# Patient Record
Sex: FEMALE | Race: WHITE | Employment: OTHER | URBAN - NONMETROPOLITAN AREA
[De-identification: names, ages, dates, MRNs, and addresses within clinical notes are randomized per-mention and may not be internally consistent; named-entity substitution may affect disease eponyms.]

---

## 2020-12-01 ENCOUNTER — HOSPITAL ENCOUNTER (OUTPATIENT)
Dept: GENERAL RADIOLOGY | Age: 60
Discharge: HOME | End: 2020-12-01
Payer: MEDICAID

## 2020-12-01 PROCEDURE — 3209999900 XR COMPARISON OF OUTSIDE FILMS

## 2020-12-21 ENCOUNTER — OFFICE VISIT (OUTPATIENT)
Dept: PAIN MANAGEMENT | Age: 60
End: 2020-12-21
Payer: MEDICARE

## 2020-12-21 VITALS — DIASTOLIC BLOOD PRESSURE: 80 MMHG | HEIGHT: 67 IN | SYSTOLIC BLOOD PRESSURE: 128 MMHG | BODY MASS INDEX: 33.67 KG/M2

## 2020-12-21 PROCEDURE — 99204 OFFICE O/P NEW MOD 45 MIN: CPT | Performed by: ANESTHESIOLOGY

## 2020-12-21 RX ORDER — TRIAMTERENE AND HYDROCHLOROTHIAZIDE 37.5; 25 MG/1; MG/1
1 TABLET ORAL DAILY
COMMUNITY

## 2020-12-21 RX ORDER — HYDROCODONE BITARTRATE AND ACETAMINOPHEN 5; 325 MG/1; MG/1
1 TABLET ORAL 2 TIMES DAILY PRN
Qty: 60 TABLET | Refills: 0 | Status: SHIPPED | OUTPATIENT
Start: 2020-12-21 | End: 2021-01-18 | Stop reason: SDUPTHER

## 2020-12-21 RX ORDER — PANTOPRAZOLE SODIUM 40 MG/1
40 TABLET, DELAYED RELEASE ORAL DAILY
COMMUNITY
End: 2021-11-11

## 2020-12-21 RX ORDER — ASPIRIN 81 MG/1
81 TABLET, CHEWABLE ORAL 4 TIMES DAILY
COMMUNITY

## 2020-12-21 RX ORDER — PAROXETINE HYDROCHLORIDE 20 MG/1
20 TABLET, FILM COATED ORAL EVERY MORNING
COMMUNITY

## 2020-12-21 RX ORDER — VARENICLINE TARTRATE 1 MG/1
1 TABLET, FILM COATED ORAL 2 TIMES DAILY
COMMUNITY
End: 2021-11-11 | Stop reason: ALTCHOICE

## 2020-12-21 RX ORDER — AMITRIPTYLINE HYDROCHLORIDE 25 MG/1
75 TABLET, FILM COATED ORAL NIGHTLY
COMMUNITY

## 2020-12-21 RX ORDER — CYCLOBENZAPRINE HCL 10 MG
10 TABLET ORAL 3 TIMES DAILY PRN
Qty: 90 TABLET | Refills: 2 | Status: SHIPPED | OUTPATIENT
Start: 2020-12-21 | End: 2021-01-19 | Stop reason: SDUPTHER

## 2020-12-21 RX ORDER — MECLIZINE HYDROCHLORIDE 25 MG/1
25 TABLET ORAL 3 TIMES DAILY PRN
COMMUNITY
End: 2021-11-11

## 2020-12-21 RX ORDER — TIZANIDINE 4 MG/1
4 TABLET ORAL EVERY 6 HOURS PRN
COMMUNITY
End: 2021-11-11 | Stop reason: ALTCHOICE

## 2020-12-21 RX ORDER — METOPROLOL SUCCINATE 25 MG/1
25 TABLET, EXTENDED RELEASE ORAL DAILY
COMMUNITY

## 2020-12-21 RX ORDER — PROMETHAZINE HYDROCHLORIDE 25 MG/1
25 TABLET ORAL EVERY 6 HOURS PRN
COMMUNITY

## 2020-12-21 RX ORDER — NAPROXEN 500 MG/1
500 TABLET ORAL 2 TIMES DAILY WITH MEALS
COMMUNITY
End: 2021-11-11

## 2020-12-21 NOTE — PATIENT INSTRUCTIONS
The following treatment recommendations and plan were discussed in detail with Turner File    Imaging:   I have reviewed patients imaging of XR L-spine and results were discussed with patient today. Analgesics: The patient is currently managing their pain with the use of Norco. We will continue this medication and start at a lower dose of 5 mg twice a day as needed. I recommend she take this medication only when her pain is severe (7/10). The side effect profile of long-term opioid use was discussed and recommended emphasis on multimodal strategies for pain management. Patient is taking Acetaminophen. Patient informed that the maximum amount of acetaminophen taken on a regular basis should only be 4000 mg per day. Interventions:   No interventions pursued at this visit. Anticoagulation/NPO Recommendations:   No interventions pursued at this visit. Multidisciplinary Pain Management:   In the presence of complex, chronic, and multi-factorial pain, the importance of a multidisciplinary approach to pain management in the patients management regimen was emphasized and discussed in great detail.      Referrals:  None    Prescriptions Written This Visit:   Norco    Follow-up: 4 weeks for Norco refill

## 2020-12-21 NOTE — PROGRESS NOTES
Chronic Pain Clinic Note     Encounter Date: 12/21/20    Subjective:   Chief Complaint:   Chief Complaint   Patient presents with    New Patient     lumbar pain        History of Present Illness:   Velia Graham is a 64 y.o. female seen in the Pain Clinic initially on 12/21/20 upon request from Neli Sanchez MD (PCP) for her history of chronic low back pain. 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 She history of seizures (on Topamax). She has been dealing with a longstanding history of chronic pain ever since her strokes. She describes most of her pain in her low back and upper neck. Her low back pain is primarily axial with no radiation of the legs. She does have the left leg weakness from the stroke but no new focal leg weakness, new paresthesias, saddle anesthesia, bowel/bladder incontinence. She primarily uses a walker and a wheelchair for ambulation. He states that her pain is worse with any upright activity and or moving around. Her pain is improved with rest and medications. She states that she was seeing Dr. Stef Chambers (pain management) who was previously prescribing her [address] however she states that her risk is about someone reporting that her med box was broken into and she violated her pain contract with the group. She states that she visited with her PCP recently put her on tramadol and she was unable to tolerate this. She states that she had previous epidural injection in the past which did not provide any relief and she has not had any previous RFA.     History of Interventions:   Surgery: None  Injections:  Epidural injections in past - no benefit    Current Treatment Medications:   Celexa 40 mg   Elavil 25 mg QHS  Paxil 20 mg  Tylenol PM - for sleep    Historical Treatment Medications:   Norco - unable to get refill  Flexeril - unable to get refill  Tramadol - hives  Baclofen - unable to tolerate    Imaging:  Lumbar XR  (5/7/19) Past Medical History:   Diagnosis Date    CHF (congestive heart failure) (HCC)     Chronic back pain     COPD (chronic obstructive pulmonary disease) (HCC)     Epilepsia (HCC)     GERD (gastroesophageal reflux disease)     Headache     Herniated disc     Osteoarthritis     Spinal stenosis     Stroke (HCC)     Type 2 diabetes mellitus without complication ([facility] [address]     Unspecified sleep apnea     Urinary incontinence        Past Surgical History:   Procedure Laterality Date    BACK SURGERY      CHOLECYSTECTOMY  8/16/2011    [facility]  2014    [facility]    UPPER [facility]STRO[facility]TESTINAL ENDOSCOPY  2014    [facility]       Family History   Problem Relation Age of Onset    Lung Cancer Mother     Cancer Mother 58        lung,bone and brain    Diabetes Father     Colon Cancer Neg Hx        Social History     Socioeconomic History    Marital status: Single     Spouse name: Not on file    Number of children: Not on file    Years of education: Not on file    Highest education level: Not on file   Occupational History    Not on file   Social Needs    Financial resource strain: Not on file    Food insecurity     Worry: Not on file     Inability: Not on file    Transportation needs     Medical: Not on file     Non-medical: Not on file   Tobacco Use    Smoking status: Current Every Day Smoker     Packs/day: 0.25     Years: 39.00     Pack years: 9.75     Types: Cigarettes     Start date: 5/23/1978    Smokeless tobacco: Never Used   Substance and Sexual Activity    Alcohol use: Yes     Comment: occasionally    Drug use: No    Sexual activity: Not on file   Lifestyle    Physical activity     Days per week: Not on file     Minutes per session: Not on file    Stress: Not on file   Relationships    Social connections     Talks on phone: Not on file     Gets together: Not on file     Attends Yazdanism service: Not on file     Active member of club or organization: Not on file  Metoclopramide Shortness Of Breath     Other reaction(s): Hives, Swelling - Throat  & throat swelling      Senna Itching and Rash     Other reaction(s): Difficulty breathing    Baclofen      Other reaction(s): Anaphylaxis    Dye [Iodides]     Folic Acid-Vitamin C Hives    Nicotine      Allergic to the patch    Folic Acid Hives       Review of Systems:   Constitutional: negative for weight changes or fevers  Genitourinary: negative for bowel/bladder incontinence   Musculoskeletal: positive for low back pain  Neurological: admits to left-sided weakness (previous CVA)  Behavioral/Psych: admits to anxiety/depression   All other systems reviewed and are negative    Objective:     Vitals:    12/21/20 1426   BP: 128/80       Constitutional: Pleasant, no acute distress   Head: Normocephalic, atraumatic   Eyes: Conjunctivae normal   Neck: Supple, symmetrical   Lungs: Normal respiratory effort, non-labored breathing   Cardiovascular: Limbs warm and well perfused   Abdomen: Non-protruded   Musculoskeletal: Muscle bulk symmetric, no atrophy, no gross deformities   · Lumbar Spine: SLR neg bilaterally. Positive facet loading bilaterally. Neurological: Cranial nerves II-XII grossly intact. · Gait - Normal, wheelchair dependent  · Motor: Left sided hemiparesis  · Sensory: LT sensation intact in lower limbs   · Reflexes: Negative ankle clonus  Skin: No rashes or lesions visible in lower limbs  Psychological: Cooperative, no exaggerated pain behaviors         Assessment:    Diagnosis Orders   1. Other chronic pain  HYDROcodone-acetaminophen (NORCO) 5-325 MG per tablet   2. Left-sided weakness     3. Lumbar facet joint pain     4.  Chronic bilateral low back pain without sciatica Rodo Hilton is a 64 y. o.female presenting to the pain clinic for evaluation of chronic low back pain. She has residual left-sided hemiparesis from a stroke 18 years ago. She does have lumbar facet mediated pain. We had a lengthy discussion about the use of long-term opioids. I have started her on a lower dose of her Norco 5 mg that she can take twice a day as needed for severe pain. I have continued her Flexeril as needed for muscle spasm/tightness. We will likely look at other adjuvants to start to help with her chronic pain. Additionally may pursue lumbar facet medial branch blocks to help with her back pain. We will follow-up in 4 weeks. I will obtain a urine drug screen at the next follow-up. Plan: The following treatment recommendations and plan were discussed in detail with Rodo Hilton    Imaging:   I have reviewed patients imaging of XR L-spine and results were discussed with patient today. Analgesics: The patient is currently managing their pain with the use of Norco. We will continue this medication and start at a lower dose of 5 mg twice a day as needed with plans to switch to other adjuvants as tolerated. I recommend she take this medication only when her pain is severe (7/10). I had an extensive discussion about the long-term use of opioids to treat non-cancer related pain and the risks involved including development of tolerance, physical dependence, and abuse potential.    OARRS reviewed today and is appropriate (12/21/20). Pain contract signed today (12/21/20). The side effect profile of long-term opioid use was discussed and recommended emphasis on multimodal strategies for pain management. Patient is taking Acetaminophen. Patient informed that the maximum amount of acetaminophen taken on a regular basis should only be 4000 mg per day. Interventions:   No interventions pursued at this visit.     Anticoagulation/NPO Recommendations: No interventions pursued at this visit. Multidisciplinary Pain Management:   In the presence of complex, chronic, and multi-factorial pain, the importance of a multidisciplinary approach to pain management in the patients management regimen was emphasized and discussed in great detail.      Referrals:  None    Prescriptions Written This Visit:   Lashonda Sheth (#60, 0 refills)    Follow-up: 4 weeks for Norco refill      Barbie Shahid DO  Interventional Pain Management/PM&R   [place]

## 2021-01-18 ENCOUNTER — TELEPHONE (OUTPATIENT)
Dept: PHYSICAL MEDICINE AND REHAB | Age: 61
End: 2021-01-18

## 2021-01-18 ENCOUNTER — OFFICE VISIT (OUTPATIENT)
Dept: PHYSICAL MEDICINE AND REHAB | Age: 61
End: 2021-01-18
Payer: MEDICARE

## 2021-01-18 VITALS
TEMPERATURE: 96.6 F | WEIGHT: 215 LBS | HEIGHT: 67 IN | BODY MASS INDEX: 33.74 KG/M2 | DIASTOLIC BLOOD PRESSURE: 80 MMHG | SYSTOLIC BLOOD PRESSURE: 132 MMHG

## 2021-01-18 DIAGNOSIS — M54.59 LUMBAR FACET JOINT PAIN: Primary | ICD-10-CM

## 2021-01-18 DIAGNOSIS — I69.354 HEMIPARESIS AFFECTING LEFT SIDE AS LATE EFFECT OF STROKE (HCC): ICD-10-CM

## 2021-01-18 DIAGNOSIS — M79.2 NEUROPATHIC PAIN: ICD-10-CM

## 2021-01-18 DIAGNOSIS — G89.29 OTHER CHRONIC PAIN: ICD-10-CM

## 2021-01-18 PROCEDURE — 99214 OFFICE O/P EST MOD 30 MIN: CPT | Performed by: ANESTHESIOLOGY

## 2021-01-18 RX ORDER — HYDROCODONE BITARTRATE AND ACETAMINOPHEN 5; 325 MG/1; MG/1
1 TABLET ORAL 2 TIMES DAILY PRN
Qty: 60 TABLET | Refills: 0 | Status: SHIPPED | OUTPATIENT
Start: 2021-01-21 | End: 2021-02-18 | Stop reason: SDUPTHER

## 2021-01-18 NOTE — PATIENT INSTRUCTIONS
The following treatment recommendations and plan were discussed in detail with Ramos Peterson and son. Imaging:   I have reviewed patients imaging of XR L-spine and results were discussed with patient today. Analgesics: The patient is currently managing their pain with the use of Norco. We will continue this medication and start at a lower dose of 5 mg twice a day as needed with plans to switch to other adjuvants as tolerated. I recommend she take this medication only when her pain is severe (7/10). I had an extensive discussion about the long-term use of opioids to treat non-cancer related pain and the risks involved including development of tolerance, physical dependence, and abuse potential.    OARRS reviewed today and is appropriate (1/18/21). Pain contract signed (12/21/20). UDS performed today (1/18/21). Patient is taking Acetaminophen. Patient informed that the maximum amount of acetaminophen taken on a regular basis should only be 4000 mg per day. Interventions: In presence of lumbar axial back pain and with physical exam consistent for facetal pain, the option of medial branch blocks at bilateral L4/L5 and L5/S1 was chosen. The risks and benefits were discussed in detail with the patient. Patient wants to proceed with the injection. Anticoagulation/NPO Recommendations:   Patient does not need to hold any medications prior to the procedure. Patient will need to be NPO x 8 priors to the procedure. We will start an IV prior to the procedure. Multidisciplinary Pain Management:   In the presence of complex, chronic, and multi-factorial pain, the importance of a multidisciplinary approach to pain management in the patients management regimen was emphasized and discussed in great detail.      Referrals:  None    Prescriptions Written This Visit:   Philly Zhong (#60, 0 refills)    Follow-up: For lumbar MBBs

## 2021-01-18 NOTE — PROGRESS NOTES
Chronic Pain Clinic Note     Encounter Date: 1/18/21    Subjective:   Chief Complaint:   Chief Complaint   Patient presents with    Follow-up     4 week medication follow up        History of Present Illness:   Juana Gallegos is a 64 y.o. female seen in the Pain Clinic initially on 12/21/20 upon request from Adam Barron MD (PCP) for her history of chronic low back pain. 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 She history of seizures (on Topamax). She has been dealing with a longstanding history of chronic pain ever since her strokes. She describes most of her pain in her low back and upper neck. Her low back pain is primarily axial with no radiation of the legs. She does have the left leg weakness from the stroke but no new focal leg weakness, new paresthesias, saddle anesthesia, bowel/bladder incontinence. She primarily uses a walker and a wheelchair for ambulation. He states that her pain is worse with any upright activity and or moving around. Her pain is improved with rest and medications. She states that she was seeing Dr. Talon Farmer (pain management) who was previously prescribing her [facility] however she states that her risk is about someone reporting that her med box was broken into and she violated her pain contract with the group. She states that she visited with her PCP recently put her on tramadol and she was unable to tolerate this. She states that she had previous epidural injection in the past which did not provide any relief and she has not had any previous RFA. Today, 1/18/2021, patient presents for planned follow-up of chronic low back pain. She endorses that she continues to have low back pain with no pain radiating down into the legs. She denies any new leg weakness, new paresthesias, saddle anesthesia, or bowel/bladder incontinence.   She states that the [facility] is helping out somewhat but still feels like she is not getting relief she is looking for. Her pain is interfering with her sleep and everyday activities.     History of Interventions:   Surgery: None  Injections:  Epidural injections in past - no benefit    Current Treatment Medications:   Norco 5 mg BID - prescribed by me  Celexa 40 mg   Elavil 25 mg QHS  Paxil 20 mg  Tylenol PM - for sleep    Historical Treatment Medications:   Norco - unable to get refill  Flexeril - unable to get refill  Tramadol - hives  Baclofen - unable to tolerate    Imaging:  Lumbar XR  (5/7/19)        Past Medical History:   Diagnosis Date    [facility]     Chronic back pain     COPD [facility] pulmonary disease) [facility]     Epilepsia ([facility]     GERD (gastroesophageal reflux disease)     Headache     Herniated disc     Osteoarthritis     Spinal stenosis     Stroke ([facility] [address]     Type 2 diabetes mellitus without complication ([facility] [address]     Unspecified sleep apnea     Urinary incontinence        Past Surgical History:   Procedure Laterality Date    BACK SURGERY      CHOLECYSTECTOMY  8/16/2011    [facility]  2014    [facility]    UPPER GASTRO[facility]TESTINAL ENDOSCOPY  2014    [facility]       Family History   Problem Relation Age of Onset    Lung Cancer Mother     Cancer Mother 58        lung,bone and brain    Diabetes Father     Colon Cancer Neg Hx        Social History     Socioeconomic History    Marital status: Single     Spouse name: Not on file    Number of children: Not on file    Years of education: Not on file    Highest education level: Not on file   Occupational History    Not on file   Social Needs    Financial resource strain: Not on file    Food insecurity     Worry: Not on file     Inability: Not on file    Transportation needs     Medical: Not on file     Non-medical: Not on file   Tobacco Use    Smoking status: Current Every Day Smoker     Packs/day: 0.25     Years: 39.00     Pack years: 9.75     Types: Cigarettes Start date: 5/23/1978    Smokeless tobacco: Never Used   Substance and Sexual Activity    Alcohol use: Yes     Comment: occasionally    Drug use: No    Sexual activity: Not on file   Lifestyle    Physical activity     Days per week: Not on file     Minutes per session: Not on file    Stress: Not on file   Relationships    Social connections     Talks on phone: Not on file     Gets together: Not on file     Attends [employer] service: Not on file     Active member of club or organization: Not on file     Attends meetings of clubs or organizations: Not on file     Relationship status: Not on file    Intimate partner violence     Fear of current or ex partner: Not on file     Emotionally abused: Not on file     Physically abused: Not on file     Forced sexual activity: Not on file   Other Topics Concern    Not on file   Social History Narrative    Not on file       Medications & Allergies:   Current Outpatient Medications   Medication Instructions    ALBUTEROL SULFATE IN 1 ampule, Nebulization, EVERY 6 HOURS PRN    amitriptyline (ELAVIL) 25 mg, Oral, NIGHTLY    aspirin 81 mg, Oral, DAILY    Chantix 1 mg, Oral, 2 TIMES DAILY    citalopram (CELEXA) 40 mg, Oral, DAILY    cyclobenzaprine (FLEXERIL) 10 mg, Oral, 3 TIMES DAILY PRN    diphenoxylate-atropine (LOMOTIL) 2.5-0.025 MG per tablet 1 tablet, 4 TIMES DAILY PRN    dipyridamole-aspirin (AGGRENOX)  MG per SR capsule 1 capsule, 4 TIMES DAILY    esomeprazole Magnesium (NEXIUM) 40 mg, Oral, 2 TIMES DAILY    [START ON 1/21/2021] HYDROcodone-acetaminophen (NORCO) 5-325 MG per tablet 1 tablet, Oral, 2 TIMES DAILY PRN    linaclotide (LINZESS) 145 mcg, Oral, DAILY BEFORE BREAKFAST    meclizine (ANTIVERT) 25 mg, Oral, 3 TIMES DAILY PRN    metoprolol succinate (TOPROL XL) 25 mg, Oral, DAILY    naproxen (NAPROSYN) 500 mg, Oral, 2 TIMES DAILY WITH MEALS    ondansetron (ZOFRAN) 8 mg, EVERY 8 HOURS PRN    oxybutynin (DITROPAN) 5 mg, 3 TIMES DAILY    pantoprazole (PROTONIX) 40 mg, Oral, DAILY    PARoxetine (PAXIL) 20 mg, Oral, EVERY MORNING    promethazine (PHENERGAN) 25 mg, Oral, EVERY 6 HOURS PRN    tiZANidine (ZANAFLEX) 4 mg, Oral, EVERY 6 HOURS PRN    triamterene-hydroCHLOROthiazide (MAXZIDE-25) 37.5-25 MG per tablet 1 tablet, Oral, DAILY       Allergies   Allergen Reactions    Fluticasone Itching and Shortness Of Breath     Other reaction(s): Swelling - Throat    Lac Bovis Shortness Of Breath and Swelling     Other reaction(s): Swelling - Throat    Metoclopramide Shortness Of Breath     Other reaction(s): Hives, Swelling - Throat  & throat swelling      Senna Itching and Rash     Other reaction(s): Difficulty breathing    Baclofen      Other reaction(s): Anaphylaxis    Dye [Iodides]     Folic Acid-Vitamin C Hives    Nicotine      Allergic to the patch    Folic Acid Hives       Review of Systems:   Constitutional: negative for weight changes or fevers  Genitourinary: negative for bowel/bladder incontinence   Musculoskeletal: positive for low back pain  Neurological: admits to left-sided weakness (previous CVA)  Behavioral/Psych: admits to anxiety/depression   All other systems reviewed and are negative    Objective:     Vitals:    01/18/21 1340   BP: 132/80   Temp: 96.6 °F (35.9 °C)       Constitutional: Pleasant, no acute distress   Head: Normocephalic, atraumatic   Eyes: Conjunctivae normal   Neck: Supple, symmetrical   Lungs: Normal respiratory effort, non-labored breathing   Cardiovascular: Limbs warm and well perfused   Abdomen: Non-protruded   Musculoskeletal: Muscle bulk symmetric, no atrophy, no gross deformities   · Lumbar Spine: SLR neg bilaterally. Positive facet loading bilaterally. Neurological: Cranial nerves II-XII grossly intact.    · Gait - Normal, wheelchair dependent  · Motor: Left sided hemiparesis  · Sensory: LT sensation intact in lower limbs   · Reflexes: Negative ankle clonus  Skin: No rashes or lesions visible in lower limbs  Psychological: Cooperative, no exaggerated pain behaviors         Assessment:    Diagnosis Orders   1. Lumbar facet joint pain  CHG FLUOR NEEDLE/CATH SPINE/PARASPINAL DX/THER ADDON    DE INJ DX/THER AGNT PARAVERT FACET JOINT, LUMBAR/SAC, 1ST LEVEL    DE INJ DX/THER AGNT PARAVERT FACET JOINT, LUMBAR/SAC, 2ND LEVEL    Urine Drug Screen   2. Other chronic pain  HYDROcodone-acetaminophen (NORCO) 5-325 MG per tablet   3. Hemiparesis affecting left side as late effect of stroke ([facility] [address]     4. Neuropathic pain           Juana Gallegos is a 64 y. o.female presenting to the pain clinic for evaluation of chronic low back pain. She has residual left-sided hemiparesis from a stroke 18 years ago. She does have lumbar facet mediated pain. We had a lengthy discussion about the use of long-term opioids. I have started her on a lower dose of her Norco 5 mg that she can take twice a day as needed for severe pain. I have continued her Flexeril as needed for muscle spasm/tightness. We will likely look at other adjuvants to start to help with her chronic pain. She has noted benefit on the Norco 5 mg twice a day as needed. I have ordered for a urine drug screen today. I have set her up for diagnostic lumbar medial branch blocks targeting the bilateral facet joints of L4/L5 and L5/S1. Plan: The following treatment recommendations and plan were discussed in detail with Juana Gallegos    Imaging:   I have reviewed patients imaging of XR L-spine and results were discussed with patient today. Analgesics: The patient is currently managing their pain with the use of Norco. We will continue this medication and start at a lower dose of 5 mg twice a day as needed with plans to switch to other adjuvants as tolerated. I recommend she take this medication only when her pain is severe (7/10).  I had an extensive discussion about the long-term use of opioids to treat non-cancer related pain and the risks involved including development of tolerance, physical dependence, and abuse potential.    OARRS reviewed today and is appropriate (1/18/21). Pain contract signed (12/21/20). UDS performed today (1/18/21). Patient is taking Acetaminophen. Patient informed that the maximum amount of acetaminophen taken on a regular basis should only be 4000 mg per day. Interventions: In presence of lumbar axial back pain and with physical exam consistent for facetal pain, the option of medial branch blocks at bilateral L4/L5 and L5/S1 was chosen. The risks and benefits were discussed in detail with the patient. Patient wants to proceed with the injection. Anticoagulation/NPO Recommendations:   Patient does not need to hold any medications prior to the procedure. Patient will need to be NPO x 8 priors to the procedure. We will start an IV prior to the procedure. Multidisciplinary Pain Management:   In the presence of complex, chronic, and multi-factorial pain, the importance of a multidisciplinary approach to pain management in the patients management regimen was emphasized and discussed in great detail.      Referrals:  None    Prescriptions Written This Visit:   [address] [facility] (#60, 0 refills)    Follow-up: For lumbar MBBs      Christopher Drake,   Interventional Pain Management/PM&R   [place]

## 2021-01-19 RX ORDER — CYCLOBENZAPRINE HCL 10 MG
10 TABLET ORAL 3 TIMES DAILY PRN
Qty: 90 TABLET | Refills: 0 | Status: CANCELLED | OUTPATIENT
Start: 2021-01-19 | End: 2021-02-18

## 2021-01-19 RX ORDER — CYCLOBENZAPRINE HCL 10 MG
10 TABLET ORAL 3 TIMES DAILY PRN
Qty: 90 TABLET | Refills: 2 | Status: SHIPPED | OUTPATIENT
Start: 2021-01-19 | End: 2021-11-11

## 2021-01-19 NOTE — TELEPHONE ENCOUNTER
Last seen: 1/18/2021.    Follow-up:   Future Appointments   Date Time Provider Delgado Jimenez   2/15/2021 10:00 AM DO BETHANY Mcfadden [facility] [facility]

## 2021-01-19 NOTE — TELEPHONE ENCOUNTER
Medication sent to her pharmacy.       Mulu Ip, DO  Interventional Pain Management/PM&R   [place]

## 2021-02-01 ENCOUNTER — TELEPHONE (OUTPATIENT)
Dept: PHYSICAL MEDICINE AND REHAB | Age: 61
End: 2021-02-01

## 2021-02-01 NOTE — TELEPHONE ENCOUNTER
Pt. Called office regarding pre-procedure instructions. States that she has a fever,chills and a sore throat. Procedure and follow up rescheduled. Verbalized understanding.

## 2021-02-15 ENCOUNTER — TELEPHONE (OUTPATIENT)
Dept: PHYSICAL MEDICINE AND REHAB | Age: 61
End: 2021-02-15

## 2021-02-15 NOTE — TELEPHONE ENCOUNTER
Pt. Contacted. Procedure rescheduled due to being ill. Rescheduled to 2/23/2021 @ 8:30, arrive at 7:30am. Verbalized understanding.

## 2021-02-18 DIAGNOSIS — G89.29 OTHER CHRONIC PAIN: ICD-10-CM

## 2021-02-18 NOTE — TELEPHONE ENCOUNTER
Colonel Romero called requesting a refill on the following medications:  Requested Prescriptions     Pending Prescriptions Disp Refills    HYDROcodone-acetaminophen (NORCO) 5-325 MG per tablet 60 tablet 0     Sig: Take 1 tablet by mouth 2 times daily as needed for Pain for up to 30 days.      Pharmacy verified:  [facility] [place]    Date of last visit: 01/18/2021  Date of next visit (if applicable): 7/6/5627

## 2021-02-18 NOTE — TELEPHONE ENCOUNTER
OARRS reviewed. UDS: + for  Cyclobenazaprine, Dihydrocodeine, Hydrocodone, Norhydrocodone, Hydromorphone. Last seen: 1/18/2021.  Follow-up: 3/3/21

## 2021-02-19 RX ORDER — HYDROCODONE BITARTRATE AND ACETAMINOPHEN 5; 325 MG/1; MG/1
1 TABLET ORAL 2 TIMES DAILY PRN
Qty: 60 TABLET | Refills: 0 | Status: SHIPPED | OUTPATIENT
Start: 2021-02-20 | End: 2021-03-22 | Stop reason: SDUPTHER

## 2021-02-19 NOTE — TELEPHONE ENCOUNTER
Patient is typically required to have office visit with me to refill controlled substances. I understand the patient missed a few appointments for the procedures and lost follow-ups with me due to this. I just want patient to be aware for future refills on controlled substances.       Rosalia Lopez, DO  Interventional Pain Management/PM&R   [place]

## 2021-02-22 ENCOUNTER — TELEPHONE (OUTPATIENT)
Dept: PHYSICAL MEDICINE AND REHAB | Age: 61
End: 2021-02-22

## 2021-02-23 ENCOUNTER — TELEPHONE (OUTPATIENT)
Dept: PHYSICAL MEDICINE AND REHAB | Age: 61
End: 2021-02-23

## 2021-02-23 NOTE — TELEPHONE ENCOUNTER
Received call from the [facility]. Pt. Unable to have procedure today due to taking Naproxen. States it will need to be held 7 days prior to procedure. Procedure rescheduled to 3/20/2021 @ 2:40, arrive at 1:20. Follow up scheduled 3/12/2021 @ 11:30am. M for pt. To call office to verify receiving message and confirm pre-procedure instructions.

## 2021-03-01 ENCOUNTER — TELEPHONE (OUTPATIENT)
Dept: PHYSICAL MEDICINE AND REHAB | Age: 61
End: 2021-03-01

## 2021-03-01 RX ORDER — LORAZEPAM 1 MG/1
1 TABLET ORAL EVERY 8 HOURS PRN
Qty: 2 TABLET | Refills: 0 | Status: SHIPPED | OUTPATIENT
Start: 2021-03-01 | End: 2021-03-31

## 2021-03-01 NOTE — TELEPHONE ENCOUNTER
I sent the patient Ativan that she can fill up at her [address] I have the instructions of not on there and how she should take this prior to her procedure.     Thanks,    Nash Tejeda, DO  Interventional Pain Management/PM&R   [place]

## 2021-03-01 NOTE — TELEPHONE ENCOUNTER
Pre-Procedure Travel Screening Questions completed. Pt. States that you were going to prescribe something for her nerves before her procedure. Please advise. Procedure 3/2/2021.  Thanks

## 2021-03-22 ENCOUNTER — OFFICE VISIT (OUTPATIENT)
Dept: PHYSICAL MEDICINE AND REHAB | Age: 61
End: 2021-03-22
Payer: MEDICARE

## 2021-03-22 VITALS
SYSTOLIC BLOOD PRESSURE: 132 MMHG | WEIGHT: 252 LBS | BODY MASS INDEX: 41.99 KG/M2 | DIASTOLIC BLOOD PRESSURE: 62 MMHG | HEIGHT: 65 IN

## 2021-03-22 DIAGNOSIS — I69.354 HEMIPARESIS AFFECTING LEFT SIDE AS LATE EFFECT OF STROKE (HCC): ICD-10-CM

## 2021-03-22 DIAGNOSIS — M79.2 NEUROPATHIC PAIN: ICD-10-CM

## 2021-03-22 DIAGNOSIS — M54.59 LUMBAR FACET JOINT PAIN: Primary | ICD-10-CM

## 2021-03-22 DIAGNOSIS — G89.29 OTHER CHRONIC PAIN: ICD-10-CM

## 2021-03-22 PROCEDURE — 99214 OFFICE O/P EST MOD 30 MIN: CPT | Performed by: ANESTHESIOLOGY

## 2021-03-22 RX ORDER — HYDROCODONE BITARTRATE AND ACETAMINOPHEN 5; 325 MG/1; MG/1
1 TABLET ORAL 2 TIMES DAILY PRN
Qty: 90 TABLET | Refills: 0 | Status: SHIPPED | OUTPATIENT
Start: 2021-03-22 | End: 2021-05-05 | Stop reason: SDUPTHER

## 2021-03-22 NOTE — PATIENT INSTRUCTIONS
The following treatment recommendations and plan were discussed in detail with Cheryl Delilah    Imaging:   I have reviewed patients imaging of XR L-spine and results were discussed with patient today. Analgesics: The patient is currently managing their pain with the use of Norco. We will increase this medication to 5 mg every 8 hours as needed with plans to switch to other adjuvants as tolerated. I recommend she take this medication only when her pain is severe (7/10). I had an extensive discussion about the long-term use of opioids to treat non-cancer related pain and the risks involved including development of tolerance, physical dependence, and abuse potential.  I advised the patient lock and store this medication in a safe and secure space. If the medication is also stolen, I would not provide early refills. OARRS reviewed today and is appropriate. Pain contract signed (12/21/20). UDS reviewed and is appropriate. Patient is taking Acetaminophen. Patient informed that the maximum amount of acetaminophen taken on a regular basis should only be 4000 mg per day. Interventions:   No interventions pursued at this visit. Anticoagulation/NPO Recommendations:   No interventions pursued at this visit. Multidisciplinary Pain Management:   In the presence of complex, chronic, and multi-factorial pain, the importance of a multidisciplinary approach to pain management in the patients management regimen was emphasized and discussed in great detail.      Referrals:  None    Prescriptions Written This Visit:   [address] [facility] (#90, 0 refills)    Follow-up: 4 weeks

## 2021-03-22 NOTE — PROGRESS NOTES
Chronic Pain Clinic Note     Encounter Date: 3/22/21    Subjective:   Chief Complaint:   Chief Complaint   Patient presents with    Follow-up     f/u procedure       History of Present Illness:   Rachelle Khan is a 63 y.o. female seen in the Pain Clinic initially on 12/21/20 upon request from Cecilia Webster MD (PCP) for her history of chronic low back pain. 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 She history of seizures (on Topamax). She has been dealing with a longstanding history of chronic pain ever since her strokes. She describes most of her pain in her low back and upper neck. Her low back pain is primarily axial with no radiation of the legs. She does have the left leg weakness from the stroke but no new focal leg weakness, new paresthesias, saddle anesthesia, bowel/bladder incontinence. She primarily uses a walker and a wheelchair for ambulation. He states that her pain is worse with any upright activity and or moving around. Her pain is improved with rest and medications. She states that she was seeing Dr. Aaron Voss (pain management) who was previously prescribing her Arlon Brad however she states that her risk is about someone reporting that her med box was broken into and she violated her pain contract with the group. She states that she visited with her PCP recently put her on tramadol and she was unable to tolerate this. She states that she had previous epidural injection in the past which did not provide any relief and she has not had any previous RFA. Today, 3/22/2021, patient presents for planned follow-up for chronic low back pain. She states that she has been more pain lately. She has difficulty sleeping at night due to increased pain. She denies any new symptoms of focal leg weakness, new paresthesias, saddle anesthesia, bowel/bladder incontinence.   She feels like the Arlon Brad is taking the edge off but feels like she needs a middle of the day dose.   She was off any interventions at this point until she gets more help with getting a ride to the hospital.    History of Interventions:   Surgery: None  Injections:  Epidural injections in past - no benefit    Current Treatment Medications:   Norco 5 mg BID - prescribed by me  Celexa 40 mg   Elavil 25 mg QHS  Paxil 20 mg  Tylenol PM - for sleep    Historical Treatment Medications:   Norco - unable to get refill  Flexeril - unable to get refill  Tramadol - hives  Baclofen - unable to tolerate    Imaging:  Lumbar XR  (5/7/19)        Past Medical History:   Diagnosis Date    [facility]     Chronic back pain     [facility]     Epilepsia [facility]     GERD (gastroesophageal reflux disease)     Headache     Herniated disc     Hypoglycemia     Osteoarthritis     PONV (postoperative nausea and vomiting)     Prolonged emergence from general anesthesia     Spinal stenosis     Stroke [facility]     Unspecified sleep apnea     Urinary incontinence        Past Surgical History:   Procedure Laterality Date    BACK SURGERY      CHOLECYSTECTOMY  8/16/2011    [facility]    UPPER GASTROINTESTINAL ENDOSCOPY  2014    [facility]       Family History   Problem Relation Age of Onset    Lung Cancer Mother     Cancer Mother 58        lung,bone and brain    Diabetes Father     Colon Cancer Neg Hx        Social History     Socioeconomic History    Marital status: Single     Spouse name: Not on file    Number of children: Not on file    Years of education: Not on file    Highest education level: Not on file   Occupational History    Not on file   Social Needs    Financial resource strain: Not on file    Food insecurity     Worry: Not on file     Inability: Not on file    Transportation needs     Medical: Not on file     Non-medical: Not on file   Tobacco Use    Smoking status: Current Every Day Smoker     Packs/day: 0.25     Years: 39.00     Pack years: 9.75     Types: Cigarettes     Start date: 5/23/1978    Smokeless tobacco: Never Used    Tobacco comment: 2 cigarrettes per day   Substance and Sexual Activity    Alcohol use: Yes     Comment: occasionally    Drug use: No    Sexual activity: Not on file   Lifestyle    Physical activity     Days per week: Not on file     Minutes per session: Not on file    Stress: Not on file   Relationships    Social connections     Talks on phone: Not on file     Gets together: Not on file     Attends [employer] service: Not on file     Active member of club or organization: Not on file     Attends meetings of clubs or organizations: Not on file     Relationship status: Not on file    Intimate partner violence     Fear of current or ex partner: Not on file     Emotionally abused: Not on file     Physically abused: Not on file     Forced sexual activity: Not on file   Other Topics Concern    Not on file   Social History Narrative    Not on file       Medications & Allergies:   Current Outpatient Medications   Medication Instructions    ALBUTEROL SULFATE IN 1 ampule, Nebulization, EVERY 6 HOURS PRN    amitriptyline (ELAVIL) 25 mg, Oral, NIGHTLY    aspirin 81 mg, Oral, 4 TIMES DAILY    Chantix 1 mg, Oral, 2 TIMES DAILY    Ciprofloxacin (CIPRO PO) Oral    citalopram (CELEXA) 40 mg, Oral, DAILY    cyclobenzaprine (FLEXERIL) 10 mg, Oral, 3 TIMES DAILY PRN    diphenoxylate-atropine (LOMOTIL) 2.5-0.025 MG per tablet 1 tablet, 4 TIMES DAILY PRN    esomeprazole Magnesium (NEXIUM) 40 mg, Oral, 2 TIMES DAILY    HYDROcodone-acetaminophen (NORCO) 5-325 MG per tablet 1 tablet, Oral, 2 TIMES DAILY PRN    linaclotide (LINZESS) 145 mcg, Oral, DAILY BEFORE BREAKFAST    LORazepam (ATIVAN) 1 mg, Oral, EVERY 8 HOURS PRN, Please take 1 hour prior to procedure and can take additional dose 30 minutes prior to help with anxiety.     meclizine (ANTIVERT) 25 mg, Oral, 3 TIMES DAILY PRN    metoprolol succinate (TOPROL XL) 25 mg, Oral, DAILY    naproxen (NAPROSYN) 500 mg, Oral, 2 TIMES DAILY WITH MEALS    ondansetron (ZOFRAN) 8 mg, EVERY 8 HOURS PRN    oxybutynin (DITROPAN) 5 mg, 3 TIMES DAILY    pantoprazole (PROTONIX) 40 mg, Oral, DAILY    PARoxetine (PAXIL) 20 mg, Oral, EVERY MORNING    promethazine (PHENERGAN) 25 mg, Oral, EVERY 6 HOURS PRN    tiZANidine (ZANAFLEX) 4 mg, Oral, EVERY 6 HOURS PRN    triamterene-hydroCHLOROthiazide (MAXZIDE-25) 37.5-25 MG per tablet 1 tablet, Oral, DAILY       Allergies   Allergen Reactions    Fluticasone Itching and Shortness Of Breath     Other reaction(s): Swelling - Throat    Lac Bovis Shortness Of Breath and Swelling     Other reaction(s): Swelling - Throat    Metoclopramide Shortness Of Breath     Other reaction(s): Hives, Swelling - Throat  & throat swelling      Senna Itching and Rash     Other reaction(s): Difficulty breathing    Baclofen      Other reaction(s): Anaphylaxis    Dye [Iodides]     Folic Acid-Vitamin C Hives    Nicotine      Allergic to the patch    Trazodone And Nefazodone Hives    Folic Acid Hives       Review of Systems:   Constitutional: negative for weight changes or fevers  Genitourinary: negative for bowel/bladder incontinence   Musculoskeletal: positive for low back pain  Neurological: admits to left-sided weakness (previous CVA)  Behavioral/Psych: admits to anxiety/depression   All other systems reviewed and are negative    Objective:     Vitals:    03/22/21 1548   BP: 132/62       Constitutional: Pleasant, no acute distress   Head: Normocephalic, atraumatic   Eyes: Conjunctivae normal   Neck: Supple, symmetrical   Lungs: Normal respiratory effort, non-labored breathing   Cardiovascular: Limbs warm and well perfused   Abdomen: Non-protruded   Musculoskeletal: Muscle bulk symmetric, no atrophy, no gross deformities   · Lumbar Spine: SLR neg bilaterally. Positive facet loading bilaterally.   Neurological: Cranial nerves II-XII grossly intact. · Gait - Normal, wheelchair dependent  · Motor: Left sided hemiparesis  · Sensory: LT sensation intact in lower limbs   · Reflexes: Negative ankle clonus  Skin: No rashes or lesions visible in lower limbs  Psychological: Cooperative, no exaggerated pain behaviors         Assessment:    Diagnosis Orders   1. Lumbar facet joint pain     2. Other chronic pain  HYDROcodone-acetaminophen (NORCO) 5-325 MG per tablet   3. Hemiparesis affecting left side as late effect of stroke ([address]     4. Neuropathic pain           Stella Parra is a 63 y. o.female presenting to the pain clinic for evaluation of chronic low back pain. She has residual left-sided hemiparesis from a stroke 18 years ago. She does have lumbar facet mediated pain. We had a lengthy discussion about the use of long-term opioids. I have started her on a lower dose of her Norco 5 mg that she can take twice a day as needed for severe pain. I have continued her Flexeril as needed for muscle spasm/tightness. We will likely look at other adjuvants to start to help with her chronic pain. Because we are unable to proceed with a procedure. I have increased her Norco to 5 mg every 8 hours as needed for severe pain. Follow-up in 4 weeks. Plan: The following treatment recommendations and plan were discussed in detail with Stella Parra    Imaging:   I have reviewed patients imaging of XR L-spine and results were discussed with patient today. Analgesics: The patient is currently managing their pain with the use of Norco. We will increase this medication to 5 mg every 8 hours as needed with plans to switch to other adjuvants as tolerated. I recommend she take this medication only when her pain is severe (7/10).  I had an extensive discussion about the long-term use of opioids to treat non-cancer related pain and the risks involved including development of tolerance, physical dependence, and abuse potential.  I advised the patient lock and store this medication in a safe and secure space. If the medication is also stolen, I would not provide early refills. OARRS reviewed today and is appropriate. Pain contract signed (12/21/20). UDS reviewed and is appropriate. Patient is taking Acetaminophen. Patient informed that the maximum amount of acetaminophen taken on a regular basis should only be 4000 mg per day. Interventions:   No interventions pursued at this visit. Anticoagulation/NPO Recommendations:   No interventions pursued at this visit. Multidisciplinary Pain Management:   In the presence of complex, chronic, and multi-factorial pain, the importance of a multidisciplinary approach to pain management in the patients management regimen was emphasized and discussed in great detail.      Referrals:  None    Prescriptions Written This Visit:   [facility] (#90, 0 refills)    Follow-up: 4 weeks       Chavo Miller DO  Interventional Pain Management/PM&R   [place]

## 2021-04-26 ENCOUNTER — TELEPHONE (OUTPATIENT)
Dept: PHYSICAL MEDICINE AND REHAB | Age: 61
End: 2021-04-26

## 2021-04-26 NOTE — TELEPHONE ENCOUNTER
Spoke via phone, has fever nausea and vomiting today. Unable to do video visit. Does not have a smart phone. Increase to 3 tabs daily is now controlling pain.  Will need refill soon

## 2021-05-05 DIAGNOSIS — G89.29 OTHER CHRONIC PAIN: ICD-10-CM

## 2021-05-05 RX ORDER — HYDROCODONE BITARTRATE AND ACETAMINOPHEN 5; 325 MG/1; MG/1
1 TABLET ORAL EVERY 8 HOURS PRN
Qty: 90 TABLET | Refills: 0 | Status: SHIPPED | OUTPATIENT
Start: 2021-05-05 | End: 2021-06-04

## 2021-05-05 NOTE — TELEPHONE ENCOUNTER
Due to this being a controlled substance. I need the patient to come in for a follow-up within the next 4 weeks. Please see if we get the patient scheduled for the end of May or early June. This will be for a refill on the medication and to discuss how to proceed with her pain management.

## 2021-05-05 NOTE — TELEPHONE ENCOUNTER
Jed Del Valle called requesting a refill on the following medications:  Requested Prescriptions     Pending Prescriptions Disp Refills    HYDROcodone-acetaminophen (NORCO) 5-325 MG per tablet 90 tablet 0     Sig: Take 1 tablet by mouth 2 times daily as needed for Pain for up to 30 days.      Pharmacy verified:  [facility]       Date of last visit: 03-22-21  Date of next visit (if applicable): 2/62/7852

## 2021-05-06 NOTE — TELEPHONE ENCOUNTER
Attempted to contact this patient several times, unable to reach, only busy signal on both home and mobile numbers

## 2021-06-21 ENCOUNTER — OFFICE VISIT (OUTPATIENT)
Dept: PHYSICAL MEDICINE AND REHAB | Age: 61
End: 2021-06-21
Payer: MEDICARE

## 2021-06-21 VITALS
HEIGHT: 65 IN | BODY MASS INDEX: 41.99 KG/M2 | DIASTOLIC BLOOD PRESSURE: 62 MMHG | SYSTOLIC BLOOD PRESSURE: 116 MMHG | WEIGHT: 252 LBS

## 2021-06-21 DIAGNOSIS — I69.354 HEMIPARESIS AFFECTING LEFT SIDE AS LATE EFFECT OF STROKE (HCC): ICD-10-CM

## 2021-06-21 DIAGNOSIS — G89.29 OTHER CHRONIC PAIN: ICD-10-CM

## 2021-06-21 DIAGNOSIS — M79.2 NEUROPATHIC PAIN: ICD-10-CM

## 2021-06-21 DIAGNOSIS — M54.59 LUMBAR FACET JOINT PAIN: ICD-10-CM

## 2021-06-21 DIAGNOSIS — M54.10 RADICULAR LEG PAIN: Primary | ICD-10-CM

## 2021-06-21 PROCEDURE — 99214 OFFICE O/P EST MOD 30 MIN: CPT | Performed by: ANESTHESIOLOGY

## 2021-06-21 RX ORDER — PREGABALIN 75 MG/1
75 CAPSULE ORAL EVERY EVENING
Qty: 30 CAPSULE | Refills: 2 | Status: SHIPPED | OUTPATIENT
Start: 2021-06-21 | End: 2022-02-18

## 2021-06-21 RX ORDER — HYDROCODONE BITARTRATE AND ACETAMINOPHEN 5; 325 MG/1; MG/1
1 TABLET ORAL EVERY 8 HOURS PRN
Qty: 90 TABLET | Refills: 0 | Status: SHIPPED | OUTPATIENT
Start: 2021-06-21 | End: 2021-07-19 | Stop reason: SDUPTHER

## 2021-06-21 NOTE — PATIENT INSTRUCTIONS
The following treatment recommendations and plan were discussed in detail with Polo Roman    Imaging:   I have reviewed patients imaging of XR L-spine and results were discussed with patient today. Due to worsening radicular leg pain in the left leg and increased tone/spasticity, I have ordered an MRI of the lumbar spine for further evaluation for stenosis. Analgesics: The patient is currently managing their pain with the use of Norco. We will continue Norco at 5 mg every 8 hours as needed for pain. I recommend she take this medication only when her pain is severe (7/10). I had an extensive discussion about the long-term use of opioids to treat non-cancer related pain and the risks involved including development of tolerance, physical dependence, and abuse potential.  I advised the patient lock and store this medication in a safe and secure space. If the medication is also stolen, I would not provide early refills. OARRS reviewed today and is appropriate. Pain contract signed (12/21/20). UDS reviewed and is appropriate. Patient is taking Acetaminophen. Patient informed that the maximum amount of acetaminophen taken on a regular basis should only be 4000 mg per day. Adjuvants: For neuropathic pain component, start the patient on Lyrica 75 mg at night. Interventions:   No interventions pursued at this visit. Anticoagulation/NPO Recommendations:   No interventions pursued at this visit. Multidisciplinary Pain Management:   In the presence of complex, chronic, and multi-factorial pain, the importance of a multidisciplinary approach to pain management in the patients management regimen was emphasized and discussed in great detail.      Referrals:  None    Prescriptions Written This Visit:   [address] [facility],[address] (#90, 0 refills)  Lyrica 75 mg    Follow-up: 8 weeks

## 2021-06-21 NOTE — PROGRESS NOTES
Chronic Pain/PM&R Clinic Note     Encounter Date: 6/21/21    Subjective:   Chief Complaint:   Chief Complaint   Patient presents with    Follow-up       History of Present Illness:   Kasi Mancuso is a 63 y.o. female seen in the clinic initially on 12/21/20 upon request from Marianela Fagan MD (PCP) for her history of chronic low back pain. 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 She history of seizures (on Topamax). She has been dealing with a longstanding history of chronic pain ever since her strokes. She describes most of her pain in her low back and upper neck. Her low back pain is primarily axial with no radiation of the legs. She does have the left leg weakness from the stroke but no new focal leg weakness, new paresthesias, saddle anesthesia, bowel/bladder incontinence. She primarily uses a walker and a wheelchair for ambulation. He states that her pain is worse with any upright activity and or moving around. Her pain is improved with rest and medications. She states that she was seeing Dr. Darylene Dull (pain management) who was previously prescribing her Ky Boss however she states that her risk is about someone reporting that her med box was broken into and she violated her pain contract with the group. She states that she visited with her PCP recently put her on tramadol and she was unable to tolerate this. She states that she had previous epidural injection in the past which did not provide any relief and she has not had any previous RFA. Today, 6/21/2021, patient presents for planned follow-up for chronic low back pain. She states that she has noticed worsening left leg pain particularly at night. She states that her spasticity has gotten worse particular in the left leg.   She feels like she will wake up with her left leg flexed towards her abdomen/chest.  She states that this leg is increasingly difficult to stretch during the day due to pain and spasticity. She continues to obtain benefit on her low-dose Norco up to 3 times a day. She feels like this medication allows her to be a little more functional throughout the day. Denies any side effects on this medication. Continues to try to stretch twice a day. She denies any bowel/bladder incontinence or saddle anesthesia.     History of Interventions:   Surgery: None  Injections:  Epidural injections in past - no benefit    Current Treatment Medications:   Norco 5 mg TID - prescribed by me  Celexa 40 mg   Elavil 25 mg QHS  Paxil 20 mg  Tylenol PM - for sleep    Historical Treatment Medications:   Flexeril - unable to tolerate  Tramadol - hives  Baclofen - unable to tolerate    Imaging:  Lumbar XR  (5/7/19)        Past Medical History:   Diagnosis Date    [facility]est[facility]     Chronic back pain     COPD (chronic obstructive pulmonary disease) ([facility]     Epilepsia ([facility])     GERD (gastroesophageal reflux disease)     Headache     Herniated disc     Hypoglycemia     Osteoarthritis     PONV (postoperative nausea and vomiting)     Prolonged emergence from general anesthesia     Spinal stenosis     [facility] [address]     Unspecified sleep apnea     Urinary incontinence        Past Surgical History:   Procedure Laterality Date    BACK SURGERY      CHOLECYSTECTOMY  8/16/2011    [facility]COPY  2014    [facility]    UPPER GASTROINTESTINAL ENDOSCOPY  2014    [facility]       Family History   Problem Relation Age of Onset    Lung Cancer Mother     Cancer Mother 58        lung,bone and brain    Diabetes Father     Colon Cancer Neg Hx        Social History     Socioeconomic History    Marital status: Single     Spouse name: Not on file    Number of children: Not on file    Years of education: Not on file    Highest education level: Not on file   Occupational History    Not on file   Tobacco Use    Smoking status: Current Every Day Smoker Packs/day: 0.25     Years: 39.00     Pack years: 9.75     Types: Cigarettes     Start date: 5/23/1978    Smokeless tobacco: Never Used    Tobacco comment: 2 cigarrettes per day   Substance and Sexual Activity    Alcohol use: Yes     Comment: occasionally    Drug use: No    Sexual activity: Not on file   Other Topics Concern    Not on file   Social History Narrative    Not on file     Social Determinants of Health     Financial Resource Strain:     Difficulty of Paying Living Expenses:    Food Insecurity:     Worried About Running Out of Food in the Last Year:     [address] in the Last Year:    Transportation Needs:     Lack of Transportation (Medical):      Lack of Transportation (Non-Medical):    Physical Activity:     Days of Exercise per Week:     Minutes of Exercise per Session:    Stress:     Feeling of Stress :    Social Connections:     Frequency of Communication with Friends and Family:     Frequency of Social Gatherings with Friends and Family:     Attends [employer] Services:     Active Member of Clubs or Organizations:     Attends Club or Organization Meetings:     Marital Status:    Intimate Partner Violence:     Fear of Current or Ex-Partner:     Emotionally Abused:     Physically Abused:     Sexually Abused:        Medications & Allergies:   Current Outpatient Medications   Medication Instructions    ALBUTEROL SULFATE IN 1 ampule, Nebulization, EVERY 6 HOURS PRN    amitriptyline (ELAVIL) 25 mg, Oral, NIGHTLY    aspirin 81 mg, Oral, 4 TIMES DAILY    Chantix 1 mg, Oral, 2 TIMES DAILY    Ciprofloxacin (CIPRO PO) Oral    citalopram (CELEXA) 40 mg, Oral, DAILY    cyclobenzaprine (FLEXERIL) 10 mg, Oral, 3 TIMES DAILY PRN    diphenoxylate-atropine (LOMOTIL) 2.5-0.025 MG per tablet 1 tablet, 4 TIMES DAILY PRN    esomeprazole Magnesium (NEXIUM) 40 mg, Oral, 2 TIMES DAILY    HYDROcodone-acetaminophen (NORCO) 5-325 MG per tablet 1 tablet, Oral, EVERY 8 HOURS PRN    Musculoskeletal: Decreased muscle bulk on left side of body with left hand contracture due to spasticity/tone  · Lumbar Spine: SLR positive on left. Neurological: Cranial nerves II-XII grossly intact. · Gait - Wheelchair dependent  · Motor: Left sided spastic hemiparesis. Difficult to assess true weakness in left leg due to spasticity/tone. · Sensory: Slight allodynia in the left lower limb  · Reflexes: Unable to assess reflexes in lower limbs due to spasticity/tone  Skin: No rashes or lesions visible in lower limbs  Psychological: Cooperative, no exaggerated pain behaviors     Assessment:    Diagnosis Orders   1. Radicular leg pain  MRI LUMBAR SPINE WO CONTRAST   2. Other chronic pain  HYDROcodone-acetaminophen (NORCO) 5-325 MG per tablet   3. Neuropathic pain  pregabalin (LYRICA) 75 MG capsule   4. Lumbar facet joint pain     5. Hemiparesis affecting left side as late effect of stroke ([facility] [address]           Tee Serrato is a 63 y. o.female presenting to the pain clinic for evaluation of chronic low back pain. She has residual left-sided hemiparesis from a stroke 18 years ago. She does have lumbar facet mediated pain. We had a lengthy discussion about the use of long-term opioids. She continues to obtain benefit on low-dose Norco and we will continue this medication every 8 hours as needed for severe pain. She does appear to have some new radicular leg pain particular in the left leg causing increased spasticity/tone. I have ordered an MRI of the lumbar spine for further evaluation. I have started her on Lyrica 75 mg at night to help with her neuropathic and radicular leg pain component. Plan: The following treatment recommendations and plan were discussed in detail with Tee Serrato    Imaging:   I have reviewed patients imaging of XR L-spine and results were discussed with patient today.      Due to worsening radicular leg pain in the left leg and increased tone/spasticity, I have ordered an MRI of the lumbar spine for further evaluation for stenosis. Analgesics: The patient is currently managing their pain with the use of Norco. We will continue Norco at 5 mg every 8 hours as needed for pain. I recommend she take this medication only when her pain is severe (7/10). I had an extensive discussion about the long-term use of opioids to treat non-cancer related pain and the risks involved including development of tolerance, physical dependence, and abuse potential.  I advised the patient lock and store this medication in a safe and secure space. If the medication is also stolen, I would not provide early refills. OARRS reviewed today and is appropriate. Pain contract signed (12/21/20). UDS reviewed and is appropriate. Patient is taking Acetaminophen. Patient informed that the maximum amount of acetaminophen taken on a regular basis should only be 4000 mg per day. Adjuvants: For neuropathic pain component, start the patient on Lyrica 75 mg at night. Interventions:   No interventions pursued at this visit. Anticoagulation/NPO Recommendations:   No interventions pursued at this visit. Multidisciplinary Pain Management:   In the presence of complex, chronic, and multi-factorial pain, the importance of a multidisciplinary approach to pain management in the patients management regimen was emphasized and discussed in great detail.      Referrals:  None    Prescriptions Written This Visit:   Sarika Hines (#90, 0 refills)  Lyrica 75 mg    Follow-up: 8 weeks       Lien Braswell DO  Interventional Pain Management/PM&R   [place]

## 2021-07-19 DIAGNOSIS — G89.29 OTHER CHRONIC PAIN: ICD-10-CM

## 2021-07-19 NOTE — TELEPHONE ENCOUNTER
OARRS reviewed. UDS: + for Cyclobenzaprine, Dihydrocodeine, HYdrocodone, Norhydrocodone, Hydromorphone. Last seen: 6/21/2021.  Follow-up:   Future Appointments   Date Time Provider Delgado Jimenez   8/19/2021  3:30 PM DO [facility]

## 2021-07-19 NOTE — TELEPHONE ENCOUNTER
Samaria Moseley called requesting a refill on the following medications:  Requested Prescriptions     Pending Prescriptions Disp Refills    HYDROcodone-acetaminophen (NORCO) 5-325 MG per tablet 90 tablet 0     Sig: Take 1 tablet by mouth every 8 hours as needed for Pain for up to 30 days.      Pharmacy verified:  [facility]    Date of last visit: 06/21/21  Date of next visit (if applicable): 5/99/3445

## 2021-07-21 RX ORDER — HYDROCODONE BITARTRATE AND ACETAMINOPHEN 5; 325 MG/1; MG/1
1 TABLET ORAL EVERY 8 HOURS PRN
Qty: 90 TABLET | Refills: 0 | Status: SHIPPED | OUTPATIENT
Start: 2021-07-21 | End: 2021-08-20

## 2021-08-31 DIAGNOSIS — G89.29 OTHER CHRONIC PAIN: ICD-10-CM

## 2021-08-31 NOTE — TELEPHONE ENCOUNTER
Johanne Richards called requesting a refill on the following medications:  Requested Prescriptions     Pending Prescriptions Disp Refills    HYDROcodone-acetaminophen (NORCO) 5-325 MG per tablet 90 tablet 0     Sig: Take 1 tablet by mouth every 8 hours as needed for Pain for up to 30 days.      Pharmacy verified:  [facility] [address] [place]    Date of last visit: 06/21/2021  Date of next visit (if applicable): No Show 02/26/0312

## 2021-09-01 RX ORDER — HYDROCODONE BITARTRATE AND ACETAMINOPHEN 5; 325 MG/1; MG/1
1 TABLET ORAL EVERY 8 HOURS PRN
Qty: 90 TABLET | Refills: 0 | OUTPATIENT
Start: 2021-09-01 | End: 2021-10-01

## 2021-09-01 NOTE — TELEPHONE ENCOUNTER
Patient no showed to appointment on 8/19/21. She needs an appointment if she wants a refill on her medication. Patient has been non-compliant with office visits and needs to understand missing appointments multiple times can be terms for dismissal from practice.       Michell Ronquillo,   Interventional Pain Management/PM&R   [place]

## 2021-09-22 ENCOUNTER — TELEPHONE (OUTPATIENT)
Dept: PHYSICAL MEDICINE AND REHAB | Age: 61
End: 2021-09-22

## 2021-09-22 DIAGNOSIS — G89.4 CHRONIC PAIN SYNDROME: Primary | ICD-10-CM

## 2021-09-22 NOTE — TELEPHONE ENCOUNTER
Patient is calling asking for a referral to physical therapy. She states in order to get a MRI she has to have PT before in order for insurance to cover it. She states she is having withdrawal symptoms since she has been out of pain medications for a while. Please advise.

## 2021-09-29 NOTE — TELEPHONE ENCOUNTER
Where does patient want to do PT at? Again, patient has been non-compliant with clinic follow-ups. I do not feel comfortable prescribing opioids if patient is unable to show up to clinic visits. At this point we will continue non-opioid pain management techniques.     Robel Gaitan, DO  Interventional Pain Management/PM&R   [place]

## 2021-11-11 ENCOUNTER — OFFICE VISIT (OUTPATIENT)
Dept: PHYSICAL MEDICINE AND REHAB | Age: 61
End: 2021-11-11
Payer: MEDICARE

## 2021-11-11 ENCOUNTER — TELEPHONE (OUTPATIENT)
Dept: PHYSICAL MEDICINE AND REHAB | Age: 61
End: 2021-11-11

## 2021-11-11 VITALS
DIASTOLIC BLOOD PRESSURE: 78 MMHG | HEIGHT: 65 IN | BODY MASS INDEX: 41.99 KG/M2 | WEIGHT: 252 LBS | SYSTOLIC BLOOD PRESSURE: 146 MMHG

## 2021-11-11 DIAGNOSIS — M79.2 NEUROPATHIC PAIN: ICD-10-CM

## 2021-11-11 DIAGNOSIS — G89.29 OTHER CHRONIC PAIN: ICD-10-CM

## 2021-11-11 DIAGNOSIS — M54.59 LUMBAR FACET JOINT PAIN: Primary | ICD-10-CM

## 2021-11-11 DIAGNOSIS — M47.896 OTHER SPONDYLOSIS, LUMBAR REGION: ICD-10-CM

## 2021-11-11 DIAGNOSIS — I69.354 HEMIPARESIS AFFECTING LEFT SIDE AS LATE EFFECT OF STROKE (HCC): ICD-10-CM

## 2021-11-11 DIAGNOSIS — M47.816 SPONDYLOSIS WITHOUT MYELOPATHY OR RADICULOPATHY, LUMBAR REGION: ICD-10-CM

## 2021-11-11 PROCEDURE — 99214 OFFICE O/P EST MOD 30 MIN: CPT | Performed by: ANESTHESIOLOGY

## 2021-11-11 RX ORDER — ATORVASTATIN CALCIUM 10 MG/1
10 TABLET, FILM COATED ORAL DAILY
COMMUNITY

## 2021-11-11 RX ORDER — CLOTRIMAZOLE AND BETAMETHASONE DIPROPIONATE 10; .64 MG/G; MG/G
CREAM TOPICAL 2 TIMES DAILY
COMMUNITY

## 2021-11-11 RX ORDER — NYSTATIN 100000 [USP'U]/G
POWDER TOPICAL 4 TIMES DAILY
COMMUNITY

## 2021-11-11 RX ORDER — OMEPRAZOLE 20 MG/1
20 CAPSULE, DELAYED RELEASE ORAL DAILY
COMMUNITY

## 2021-11-11 RX ORDER — DOCUSATE SODIUM 100 MG/1
100 CAPSULE, LIQUID FILLED ORAL 2 TIMES DAILY
COMMUNITY

## 2021-11-11 RX ORDER — TIOTROPIUM BROMIDE 18 UG/1
18 CAPSULE ORAL; RESPIRATORY (INHALATION) DAILY
COMMUNITY

## 2021-11-11 RX ORDER — ACETAMINOPHEN 325 MG/1
650 TABLET ORAL EVERY 6 HOURS PRN
COMMUNITY

## 2021-11-11 NOTE — TELEPHONE ENCOUNTER
Pt denies taking any blood thinners except ASA 81 mg. (no cardiac events in the last 6 months) prior to scheduling procedure. Pt. Scheduled with [facility] per Dr. Rafaela Sanchez.

## 2021-11-11 NOTE — PROGRESS NOTES
Chronic Pain/PM&R Clinic Note     Encounter Date: 11/11/21    Subjective:   Chief Complaint:   Chief Complaint   Patient presents with    Follow-up     neck, lower back and right leg pain. Patient is in PT and states she should be able to get MRI    Discuss Medications     patient would like pain medication       History of Present Illness:   Tang Burton is a 63 y.o. female seen in the clinic initially on 12/21/20 upon request from Claude Guzman MD (PCP) for her history of chronic low back pain. 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 She history of seizures (on Topamax). She has been dealing with a longstanding history of chronic pain ever since her strokes. She describes most of her pain in her low back and upper neck. Her low back pain is primarily axial with no radiation of the legs. She does have the left leg weakness from the stroke but no new focal leg weakness, new paresthesias, saddle anesthesia, bowel/bladder incontinence. She primarily uses a walker and a wheelchair for ambulation. He states that her pain is worse with any upright activity and or moving around. Her pain is improved with rest and medications. She states that she was seeing Dr. Christine Jang (pain management) who was previously prescribing her Omayra Michelle however she states that her risk is about someone reporting that her med box was broken into and she violated her pain contract with the group. She states that she visited with her PCP recently put her on tramadol and she was unable to tolerate this. She states that she had previous epidural injection in the past which did not provide any relief and she has not had any previous RFA. Today, 11/11/2021, patient presents for planned follow-up for chronic low back pain. She reports doing worse overall since her last visit. She states that she has been since placed in a nursing facility.   She continues to have axial low back pain as previously described with no new radiation of pain down her legs, new focal leg weakness, saddle anesthesia, bowel/bladder continence. She states that this pain is a constant 9/10 pain. She states this is been worse since stopping her pain medications. She is wonder if she can be placed back on these pain medications to help with her overall pain.     History of Interventions:   Surgery: None  Injections:  Epidural injections in past - no benefit    Current Treatment Medications:   Celexa 40 mg   Elavil 25 mg QHS  Paxil 20 mg  Tylenol PM - for sleep    Historical Treatment Medications:   Flexeril - unable to tolerate  Tramadol - hives  Baclofen - unable to tolerate  Norcostopped by me due to noncompliance with follow-ups    Imaging:  Lumbar XR  (5/7/19)        Past Medical History:   Diagnosis Date    [facility]congestive heart failure) ([facility])     Chronic back pain     COPD (ch[facility] obstructive pulmonary disease) ([facility])     [facility]     GERD (gastroesophageal reflux disease)     Headache     Herniated disc     Hypoglycemia     Osteoarthritis     PONV (postoperative nausea and vomiting)     Prolonged emergence from general anesthesia     Spinal stenosis     Stroke ([facility] [address]     Unspecified sleep apnea     Urinary incontinence        Past Surgical History:   Procedure Laterality Date    BACK SURGERY      CHOLECYSTECTOMY  8/16/2011    [facility]  2014    [facility]    UPPER GASTROINTESTINAL ENDOSCOPY  2014    [facility]       Family History   Problem Relation Age of Onset    Lung Cancer Mother     Cancer Mother 58        lung,bone and brain    Diabetes Father     Colon Cancer Neg Hx        Social History     Socioeconomic History    Marital status: Single     Spouse name: Not on file    Number of children: Not on file    Years of education: Not on file    Highest education level: Not on file   Occupational History    Not on file   Tobacco Use    Smoking status: Current Every Day Smoker     Packs/day: 0.25     Years: 39.00     Pack years: 9.75     Types: Cigarettes     Start date: 5/23/1978    Smokeless tobacco: Never Used    Tobacco comment: 2 cigarrettes per day   Substance and Sexual Activity    Alcohol use: Yes     Comment: occasionally    Drug use: No    Sexual activity: Not on file   Other Topics Concern    Not on file   Social History Narrative    Not on file     Social Determinants of Health     Financial Resource Strain:     Difficulty of Paying Living Expenses: Not on file   Food Insecurity:     Worried About Running Out of Food in the Last Year: Not on file    [place] of Food in the Last Year: Not on file   Transportation Needs:     Lack of Transportation (Medical): Not on file    Lack of Transportation (Non-Medical):  Not on file   Physical Activity:     Days of Exercise per Week: Not on file    Minutes of Exercise per Session: Not on file   Stress:     Feeling of Stress : Not on file   Social Connections:     Frequency of Communication with Friends and Family: Not on file    Frequency of Social Gatherings with Friends and Family: Not on file    Attends Bahai Services: Not on file    Active Member of [address] [place] [address] or Organizations: Not on file    Attends Club or Organization Meetings: Not on file    Marital Status: Not on file   Intimate Partner Violence:     Fear of Current or Ex-Partner: Not on file    Emotionally Abused: Not on file    Physically Abused: Not on file    Sexually Abused: Not on file   Housing Stability:     Unable to Pay for Housing in the Last Year: Not on file    Number of [place] in the Last Year: Not on file    Unstable Housing in the Last Year: Not on file       Medications & Allergies:   Current Outpatient Medications   Medication Instructions    acetaminophen (TYLENOL) 650 mg, Oral, EVERY 6 HOURS PRN    ALBUTEROL SULFATE IN 1 ampule, Nebulization, EVERY 6 HOURS PRN    amitriptyline (ELAVIL) 75 mg, Oral, NIGHTLY    aspirin 81 mg, Oral, 4 TIMES DAILY    atorvastatin (LIPITOR) 10 mg, Oral, DAILY    clotrimazole-betamethasone (LOTRISONE) 1-0.05 % cream Topical, 2 TIMES DAILY, Apply topically 2 times daily.  diphenoxylate-atropine (LOMOTIL) 2.5-0.025 MG per tablet 1 tablet, 4 TIMES DAILY PRN    docusate sodium (COLACE) 100 mg, Oral, 2 TIMES DAILY    linaclotide (LINZESS) 145 mcg, Oral, DAILY BEFORE BREAKFAST    metoprolol succinate (TOPROL XL) 25 mg, Oral, DAILY    nystatin (MYCOSTATIN) 059733 UNIT/GM powder Topical, 4 TIMES DAILY, Apply topically 4 times daily.  omeprazole (PRILOSEC) 20 mg, Oral, DAILY    oxybutynin (DITROPAN) 5 mg, 3 TIMES DAILY    PARoxetine (PAXIL) 20 mg, Oral, EVERY MORNING    pregabalin (LYRICA) 75 mg, Oral, EVERY EVENING    promethazine (PHENERGAN) 25 mg, Oral, EVERY 6 HOURS PRN    Spiriva HandiHaler 18 mcg, Inhalation, DAILY    topiramate (TOPAMAX) 200 mg, Oral, 2 TIMES DAILY    triamterene-hydroCHLOROthiazide (MAXZIDE-25) 37.5-25 MG per tablet 1 tablet, Oral, DAILY    VITAMIN D PO 1 capsule, Oral, EVERY 7 DAYS       Allergies   Allergen Reactions    Fluticasone Itching and Shortness Of Breath     Other reaction(s): Swelling - Throat    Lac Bovis Shortness Of Breath and Swelling     Other reaction(s): Swelling - Throat    Metoclopramide Shortness Of Breath     Other reaction(s): Hives, Swelling - Throat  & throat swelling      Senna Itching and Rash     Other reaction(s): Difficulty breathing    Baclofen      Other reaction(s):  Anaphylaxis    Dye [Iodides]     Folic Acid-Vitamin C Hives    Nicotine      Allergic to the patch    Trazodone And Nefazodone Hives    Folic Acid Hives       Review of Systems:   Constitutional: negative for weight changes or fevers  Genitourinary: negative for bowel/bladder incontinence   Musculoskeletal: positive for low back pain  Neurological: Positive for left-sided spasticity/weakness  Behavioral/Psych: admits to anxiety/depression All other systems reviewed and are negative    Objective:     Vitals:    11/11/21 1139   BP: (!) 146/78       Constitutional: Pleasant, no acute distress   Head: Normocephalic, atraumatic   Eyes: Conjunctivae normal   Neck: Supple, symmetrical   Lungs: Normal respiratory effort, non-labored breathing   Cardiovascular: Limbs warm and well perfused   Abdomen: Non-protruded   Musculoskeletal: Decreased muscle bulk on left side of body with left hand contracture due to spasticity/tone  · Lumbar Spine: Increased pain with facet loading bilaterally. Tenderness palpation over bilateral lumbar paraspinal muscles. Decreased ROM in extension. Neurological: Cranial nerves II-XII grossly intact. · Gait - Wheelchair dependent  · Motor: Left sided spastic hemiparesis. Difficult to assess true weakness in left leg due to spasticity/tone. · Sensory: Slight allodynia in the left lower limb  · Reflexes: Unable to assess reflexes in lower limbs due to spasticity/tone  Skin: No rashes or lesions visible in lower limbs  Psychological: Cooperative, no exaggerated pain behaviors     Assessment:    Diagnosis Orders   1. Lumbar facet joint pain  CHG FLUOR NEEDLE/CATH SPINE/PARASPINAL DX/THER ADDON    [place] INJ DX/THER AGNT PARAVERT FACET JOINT, LUMBAR/SAC, 1ST LEVEL    [place] INJ DX/THER AGNT PARAVERT FACET JOINT, LUMBAR/SAC, 2ND LEVEL   2. Other spondylosis, lumbar region   [place] INJ DX/THER AGNT PARAVERT FACET JOINT, LUMBAR/SAC, 1ST LEVEL   3. Spondylosis without myelopathy or radiculopathy, lumbar region   [place] INJ DX/THER AGNT PARAVERT FACET JOINT, LUMBAR/SAC, 2ND LEVEL   4. Other chronic pain     5. Neuropathic pain     6. Hemiparesis affecting left side as late effect of stroke ([facility] [address]           Antonia Everett is a 63 y. o.female presenting to the pain clinic for evaluation of chronic low back pain. She has residual left-sided hemiparesis from a stroke 18 years ago. She does have lumbar facet mediated pain.   We had a lengthy discussion about the use of long-term opioids. We have since stopped her opioids due to noncompliance for her follow-up visits. Patient continues to have lumbar facet mediated pain I set her up for bilateral lumbar facet steroid injections at L4/L5 and L5/S1 using MAC. Patient is working with physical therapy in her skilled nursing facility and we will potentially obtain MRI of the lumbar spine further evaluation. Plan: The following treatment recommendations and plan were discussed in detail with Tang Burton    Imaging:   I have reviewed patients imaging of XR L-spine and results were discussed with patient today. We are waiting patient's completion of PT prior to getting her MRI of the lumbar spine. Analgesics:   None; patient is no longer a candidate for opioid therapy due to noncompliance with follow-up appointments. Patient is taking Acetaminophen. Patient informed that the maximum amount of acetaminophen taken on a regular basis should only be 4000 mg per day. Adjuvants:  None    Interventions: In presence of lumbar axial back pain/cervical neck and with physical exam consistent for facetal pain, the option of lumbar facet steroid injections at bilateral L4/L5 and L5/S1 was chosen. The risks and benefits were discussed in detail with the patient. Patient wants to proceed with the injection. Anticoagulation/NPO Recommendations:   Patient will need to hold aspirin x 6 days prior and any NSAIDS x 2 days prior to the procedure. Patient will need to be NPO x 8 hours prior to the procedure. We will use MAC    Multidisciplinary Pain Management:   In the presence of complex, chronic, and multi-factorial pain, the importance of a multidisciplinary approach to pain management in the patients management regimen was emphasized and discussed in great detail.      Referrals:  None    Prescriptions Written This Visit:   None    Follow-up: 12 weeks      Christopher Clark DO  Interventional Pain Management/PM&R   [place]

## 2021-11-24 ENCOUNTER — PREP FOR PROCEDURE (OUTPATIENT)
Dept: PHYSICAL MEDICINE AND REHAB | Age: 61
End: 2021-11-24

## 2021-11-29 NOTE — H&P
Today, patient presents for planned lumbar facet steroid injections at bilateral L4/L5 and L5/S1. This note is reflective of the patient's previous visit for evaluation. We will proceed with today's planned procedure. Since patient's last visit for evaluation, there have been no interval changes in medical history. Patient has no new numbness, weakness, or focal neurological deficit since evaluation. Patient has no contraindications to injection (no anticoagulation or recent antibiotic intake for active infections), and has a  present or is able to drive themselves (as discussed and cleared by physician). Allergies to latex, contrast dye, and steroid medications have been confirmed with the patient prior to the procedure. NPO necessity has been assessed and accepted based on procedure complexity. The risks and benefits of the procedure have been explained including but are not limited to infection, bleeding, paralysis, immediate post procedure weakness, and dizziness; the patient acknowledges understanding and desires to proceed with the procedure. Patient has signed consent for same procedure as discussed in previous clinic encounter. All other questions and concerns were addressed at bedside. See procedure note for full details. Post procedure Instructions: The patient was advised not to drive during the day of the procedure and not to engage in any significant decision making (unless otherwise states by physician). The patient was also advised to be cautious with walking/activity for 24 hours following today's visit and asked not to engage in over-exertion (unless otherwise states by physician). After this time, it is ok to resume pre-procedure level of activity. Patient advised to apply ice to site of injection in situations of pain and discomfort. Patient advised to not submerge site of injection during bath or pool activities for approximately 24 hours post-procedure.  Patient attested to understanding post procedure directions / restrictions. All other questions and concerns addressed before patient discharge in ambulatory fashion. Chronic Pain/PM&R Clinic Note     Encounter Date: 11/11/21    Subjective:   Chief Complaint:   No chief complaint on file. History of Present Illness:   Sulma Miller is a 63 y.o. female seen in the clinic initially on 12/21/20 upon request from Emily Low MD (PCP) for her history of chronic low back pain. 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 She history of seizures (on Topamax). She has been dealing with a longstanding history of chronic pain ever since her strokes. She describes most of her pain in her low back and upper neck. Her low back pain is primarily axial with no radiation of the legs. She does have the left leg weakness from the stroke but no new focal leg weakness, new paresthesias, saddle anesthesia, bowel/bladder incontinence. She primarily uses a walker and a wheelchair for ambulation. He states that her pain is worse with any upright activity and or moving around. Her pain is improved with rest and medications. She states that she was seeing Dr. Amee Bernstein (pain management) who was previously prescribing her Nitish Leelee however she states that her risk is about someone reporting that her med box was broken into and she violated her pain contract with the group. She states that she visited with her PCP recently put her on tramadol and she was unable to tolerate this. She states that she had previous epidural injection in the past which did not provide any relief and she has not had any previous RFA. Today, 11/11/2021, patient presents for planned follow-up for chronic low back pain. She reports doing worse overall since her last visit. She states that she has been since placed in a nursing facility.   She continues to have axial low back pain as previously described with no new radiation of pain down her legs, new focal leg weakness, saddle anesthesia, bowel/bladder continence. She states that this pain is a constant 9/10 pain. She states this is been worse since stopping her pain medications. She is wonder if she can be placed back on these pain medications to help with her overall pain.     History of Interventions:   Surgery: None  Injections:  Epidural injections in past - no benefit    Current Treatment Medications:   Celexa 40 mg   Elavil 25 mg QHS  Paxil 20 mg  Tylenol PM - for sleep    Historical Treatment Medications:   Flexeril - unable to tolerate  Tramadol - hives  Baclofen - unable to tolerate  Norco-stopped by me due to noncompliance with follow-ups    Imaging:  Lumbar XR  (5/7/19)        Past Medical History:   Diagnosis Date    [facility]     Chronic back pain     COPD [facility] obstructive pulmonary disease) [facility]     [facility] [address]     GERD (gastroesophageal reflux disease)     Headache     Herniated disc     Hypoglycemia     Osteoarthritis     PONV (postoperative nausea and vomiting)     Prolonged emergence from general anesthesia     Spinal stenosis     Stroke ([facility] [address]     Unspecified sleep apnea     Urinary incontinence        Past Surgical History:   Procedure Laterality Date    BACK SURGERY      CHOLECYSTECTOMY  8/16/2011    [facility]    UPPER GASTROINTESTINAL ENDOSCOPY  2014    [facility]       Family History   Problem Relation Age of Onset    Lung Cancer Mother     Cancer Mother 58        lung,bone and brain    Diabetes Father     Colon Cancer Neg Hx        Social History     Socioeconomic History    Marital status: Single     Spouse name: Not on file    Number of children: Not on file    Years of education: Not on file    Highest education level: Not on file   Occupational History    Not on file   Tobacco Use    Smoking status: Current Every Day Smoker Packs/day: 0.25     Years: 39.00     Pack years: 9.75     Types: Cigarettes     Start date: 5/23/1978    Smokeless tobacco: Never Used    Tobacco comment: 2 cigarrettes per day   Substance and Sexual Activity    Alcohol use: Yes     Comment: occasionally    Drug use: No    Sexual activity: Not on file   Other Topics Concern    Not on file   Social History Narrative    Not on file     Social Determinants of Health     Financial Resource Strain:     Difficulty of Paying Living Expenses: Not on file   Food Insecurity:     Worried About Running Out of Food in the Last Year: Not on file    Chap[place] of Food in the Last Year: Not on file   Transportation Needs:     Lack of Transportation (Medical): Not on file    Lack of Transportation (Non-Medical):  Not on file   Physical Activity:     Days of Exercise per Week: Not on file    Minutes of Exercise per Session: Not on file   Stress:     Feeling of Stress : Not on file   Social Connections:     Frequency of Communication with Friends and Family: Not on file    Frequency of Social Gatherings with Friends and Family: Not on file    Attends Advent Services: Not on file    Active Member of [address] [place] [address] [employer] or Organizations: Not on file    Attends Club or Organization Meetings: Not on file    Marital Status: Not on file   Intimate Partner Violence:     Fear of Current or Ex-Partner: Not on file    Emotionally Abused: Not on file    Physically Abused: Not on file    Sexually Abused: Not on file   Housing Stability:     Unable to Pay for Housing in the Last Year: Not on file    Number of [place] in the Last Year: Not on file    Unstable Housing in the Last Year: Not on file       Medications & Allergies:   Current Outpatient Medications   Medication Instructions    acetaminophen (TYLENOL) 650 mg, Oral, EVERY 6 HOURS PRN    ALBUTEROL SULFATE IN 1 ampule, Nebulization, EVERY 6 HOURS PRN    amitriptyline (ELAVIL) 75 mg, Oral, NIGHTLY    aspirin 81 mg, Oral, are negative    Objective: There were no vitals filed for this visit. Constitutional: Pleasant, no acute distress   Head: Normocephalic, atraumatic   Eyes: Conjunctivae normal   Neck: Supple, symmetrical   Lungs: Normal respiratory effort, non-labored breathing   Cardiovascular: Limbs warm and well perfused   Abdomen: Non-protruded   Musculoskeletal: Decreased muscle bulk on left side of body with left hand contracture due to spasticity/tone  · Lumbar Spine: Increased pain with facet loading bilaterally. Tenderness palpation over bilateral lumbar paraspinal muscles. Decreased ROM in extension. Neurological: Cranial nerves II-XII grossly intact. · Gait - Wheelchair dependent  · Motor: Left sided spastic hemiparesis. Difficult to assess true weakness in left leg due to spasticity/tone. · Sensory: Slight allodynia in the left lower limb  · Reflexes: Unable to assess reflexes in lower limbs due to spasticity/tone  Skin: No rashes or lesions visible in lower limbs  Psychological: Cooperative, no exaggerated pain behaviors     Assessment:    Diagnosis Orders   1. Lumbar facet joint pain  CHG FLUOR NEEDLE/CATH SPINE/PARASPINAL DX/THER ADDON    [place] INJ DX/THER AGNT PARAVERT FACET JOINT, LUMBAR/SAC, 1ST LEVEL    [place] INJ DX/THER AGNT PARAVERT FACET JOINT, LUMBAR/SAC, 2ND LEVEL   2. Other spondylosis, lumbar region   [place] INJ DX/THER AGNT PARAVERT FACET JOINT, LUMBAR/SAC, 1ST LEVEL   3. Spondylosis without myelopathy or radiculopathy, lumbar region   [place] INJ DX/THER AGNT PARAVERT FACET JOINT, LUMBAR/SAC, 2ND LEVEL   4. Other chronic pain     5. Neuropathic pain     6. Hemiparesis affecting left side as late effect of stroke ([facility] [address]           Shawn Ji is a 63 y. o.female presenting to the pain clinic for evaluation of chronic low back pain. She has residual left-sided hemiparesis from a stroke 18 years ago. She does have lumbar facet mediated pain. We had a lengthy discussion about the use of long-term opioids. We have since stopped her opioids due to noncompliance for her follow-up visits. Patient continues to have lumbar facet mediated pain I set her up for bilateral lumbar facet steroid injections at L4/L5 and L5/S1 using MAC. Patient is working with physical therapy in her skilled nursing facility and we will potentially obtain MRI of the lumbar spine further evaluation. Plan: The following treatment recommendations and plan were discussed in detail with Stacia Jain    Imaging:   I have reviewed patients imaging of XR L-spine and results were discussed with patient today. We are waiting patient's completion of PT prior to getting her MRI of the lumbar spine. Analgesics:   None; patient is no longer a candidate for opioid therapy due to noncompliance with follow-up appointments. Patient is taking Acetaminophen. Patient informed that the maximum amount of acetaminophen taken on a regular basis should only be 4000 mg per day. Adjuvants:  None    Interventions: In presence of lumbar axial back pain/cervical neck and with physical exam consistent for facetal pain, the option of lumbar facet steroid injections at bilateral L4/L5 and L5/S1 was chosen. The risks and benefits were discussed in detail with the patient. Patient wants to proceed with the injection. Anticoagulation/NPO Recommendations:   Patient will need to hold aspirin x 6 days prior and any NSAIDS x 2 days prior to the procedure. Patient will need to be NPO x 8 hours prior to the procedure. We will use MAC    Multidisciplinary Pain Management:   In the presence of complex, chronic, and multi-factorial pain, the importance of a multidisciplinary approach to pain management in the patients management regimen was emphasized and discussed in great detail.      Referrals:  None    Prescriptions Written This Visit:   None    Follow-up: 12 weeks      Christopher Jones,   Interventional Pain Management/PM&R   [facility] and [address]

## 2021-11-30 ENCOUNTER — ANESTHESIA (OUTPATIENT)
Dept: OPERATING ROOM | Age: 61
End: 2021-11-30
Payer: MEDICARE

## 2021-11-30 ENCOUNTER — APPOINTMENT (OUTPATIENT)
Dept: GENERAL RADIOLOGY | Age: 61
End: 2021-11-30
Attending: ANESTHESIOLOGY
Payer: MEDICARE

## 2021-11-30 ENCOUNTER — HOSPITAL ENCOUNTER (OUTPATIENT)
Age: 61
Setting detail: OUTPATIENT SURGERY
Discharge: OTHER INSTITUTION | End: 2021-11-30
Attending: ANESTHESIOLOGY | Admitting: ANESTHESIOLOGY
Payer: MEDICARE

## 2021-11-30 ENCOUNTER — ANESTHESIA EVENT (OUTPATIENT)
Dept: OPERATING ROOM | Age: 61
End: 2021-11-30
Payer: MEDICARE

## 2021-11-30 VITALS
OXYGEN SATURATION: 94 % | RESPIRATION RATE: 16 BRPM | SYSTOLIC BLOOD PRESSURE: 135 MMHG | DIASTOLIC BLOOD PRESSURE: 65 MMHG

## 2021-11-30 VITALS
DIASTOLIC BLOOD PRESSURE: 75 MMHG | TEMPERATURE: 97.6 F | SYSTOLIC BLOOD PRESSURE: 121 MMHG | HEIGHT: 65 IN | HEART RATE: 97 BPM | RESPIRATION RATE: 16 BRPM | OXYGEN SATURATION: 97 % | BODY MASS INDEX: 41.45 KG/M2 | WEIGHT: 248.8 LBS

## 2021-11-30 PROCEDURE — 2500000003 HC RX 250 WO HCPCS: Performed by: ANESTHESIOLOGY

## 2021-11-30 PROCEDURE — 7100000011 HC PHASE II RECOVERY - ADDTL 15 MIN: Performed by: ANESTHESIOLOGY

## 2021-11-30 PROCEDURE — 3600000057 HC PAIN LEVEL 4 ADDL 15 MIN: Performed by: ANESTHESIOLOGY

## 2021-11-30 PROCEDURE — 6360000002 HC RX W HCPCS

## 2021-11-30 PROCEDURE — 7100000010 HC PHASE II RECOVERY - FIRST 15 MIN: Performed by: ANESTHESIOLOGY

## 2021-11-30 PROCEDURE — 3600000056 HC PAIN LEVEL 4 BASE: Performed by: ANESTHESIOLOGY

## 2021-11-30 PROCEDURE — 3700000001 HC ADD 15 MINUTES (ANESTHESIA): Performed by: ANESTHESIOLOGY

## 2021-11-30 PROCEDURE — 2500000003 HC RX 250 WO HCPCS

## 2021-11-30 PROCEDURE — 2709999900 HC NON-CHARGEABLE SUPPLY: Performed by: ANESTHESIOLOGY

## 2021-11-30 PROCEDURE — 3700000000 HC ANESTHESIA ATTENDED CARE: Performed by: ANESTHESIOLOGY

## 2021-11-30 PROCEDURE — 64494 INJ PARAVERT F JNT L/S 2 LEV: CPT | Performed by: ANESTHESIOLOGY

## 2021-11-30 PROCEDURE — 3209999900 FLUORO FOR SURGICAL PROCEDURES

## 2021-11-30 PROCEDURE — 6360000002 HC RX W HCPCS: Performed by: ANESTHESIOLOGY

## 2021-11-30 PROCEDURE — 64493 INJ PARAVERT F JNT L/S 1 LEV: CPT | Performed by: ANESTHESIOLOGY

## 2021-11-30 RX ORDER — DEXAMETHASONE SODIUM PHOSPHATE 4 MG/ML
INJECTION, SOLUTION INTRA-ARTICULAR; INTRALESIONAL; INTRAMUSCULAR; INTRAVENOUS; SOFT TISSUE PRN
Status: DISCONTINUED | OUTPATIENT
Start: 2021-11-30 | End: 2021-11-30 | Stop reason: ALTCHOICE

## 2021-11-30 RX ORDER — PROPOFOL 10 MG/ML
INJECTION, EMULSION INTRAVENOUS PRN
Status: DISCONTINUED | OUTPATIENT
Start: 2021-11-30 | End: 2021-11-30 | Stop reason: SDUPTHER

## 2021-11-30 RX ORDER — BUPIVACAINE HYDROCHLORIDE 5 MG/ML
INJECTION, SOLUTION PERINEURAL PRN
Status: DISCONTINUED | OUTPATIENT
Start: 2021-11-30 | End: 2021-11-30 | Stop reason: ALTCHOICE

## 2021-11-30 RX ORDER — ONDANSETRON 2 MG/ML
INJECTION INTRAMUSCULAR; INTRAVENOUS PRN
Status: DISCONTINUED | OUTPATIENT
Start: 2021-11-30 | End: 2021-11-30 | Stop reason: SDUPTHER

## 2021-11-30 RX ORDER — LIDOCAINE HYDROCHLORIDE 20 MG/ML
INJECTION, SOLUTION EPIDURAL; INFILTRATION; INTRACAUDAL; PERINEURAL PRN
Status: DISCONTINUED | OUTPATIENT
Start: 2021-11-30 | End: 2021-11-30 | Stop reason: SDUPTHER

## 2021-11-30 RX ORDER — LIDOCAINE HYDROCHLORIDE 10 MG/ML
INJECTION, SOLUTION EPIDURAL; INFILTRATION; INTRACAUDAL; PERINEURAL PRN
Status: DISCONTINUED | OUTPATIENT
Start: 2021-11-30 | End: 2021-11-30 | Stop reason: ALTCHOICE

## 2021-11-30 RX ADMIN — PROPOFOL 60 MG: 10 INJECTION, EMULSION INTRAVENOUS at 10:04

## 2021-11-30 RX ADMIN — LIDOCAINE HYDROCHLORIDE 100 MG: 20 INJECTION, SOLUTION EPIDURAL; INFILTRATION; INTRACAUDAL; PERINEURAL at 10:04

## 2021-11-30 RX ADMIN — ONDANSETRON HYDROCHLORIDE 4 MG: 4 INJECTION, SOLUTION INTRAMUSCULAR; INTRAVENOUS at 10:04

## 2021-11-30 ASSESSMENT — PULMONARY FUNCTION TESTS
PIF_VALUE: 35
PIF_VALUE: 30
PIF_VALUE: 34
PIF_VALUE: 35
PIF_VALUE: 30
PIF_VALUE: 35

## 2021-11-30 ASSESSMENT — PAIN SCALES - GENERAL: PAINLEVEL_OUTOF10: 0

## 2021-11-30 ASSESSMENT — PAIN - FUNCTIONAL ASSESSMENT: PAIN_FUNCTIONAL_ASSESSMENT: 0-10

## 2021-11-30 NOTE — ANESTHESIA POSTPROCEDURE EVALUATION
Department of Anesthesiology  Postprocedure Note    Patient: [place] Lipoma  MRN: [id number]  YOB: 1960  Date of evaluation: 11/30/2021  Time:  10:16 AM     Procedure Summary     Date: 11/30/21 Room / Location: [address] [place] [address] 01 / 138 shai Jordan    Anesthesia Start: 8743 Anesthesia Stop: 8825    Procedure: lumbar facet steroid injections at bilateral L4/L5 and L5/S1 was chosen (Bilateral ) Diagnosis: (Lumbar facet joint pain)    Surgeons: Pilar Chung DO Responsible Provider: [address] [place] [address]    Anesthesia Type: MAC ASA Status: 3          Anesthesia Type: MAC    [place] Phase I:      [place] Phase II:      Last vitals: Reviewed and per EMR flowsheets.        Anesthesia Post Evaluation    Patient location during evaluation: bedside  Patient participation: complete - patient participated  Level of consciousness: awake and alert  Pain score: 0  Airway patency: patent  Nausea & Vomiting: no nausea and no vomiting  Complications: no  Cardiovascular status: hemodynamically stable and blood pressure returned to baseline  Respiratory status: spontaneous ventilation, room air and acceptable  Hydration status: stable

## 2021-11-30 NOTE — ANESTHESIA PRE PROCEDURE
Department of Anesthesiology  Preprocedure Note       Name:  Gabe Freitas   Age:  63 y.o.  :  1960                                          MRN:  [id number]         Date:  2021      Surgeon: Sarahi Lucero):  Louis Marina DO    Procedure: Procedure(s):  lumbar facet steroid injections at bilateral L4/L5 and L5/S1 was chosen    Medications prior to admission:   Prior to Admission medications    Medication Sig Start Date End Date Taking? Authorizing Provider   atorvastatin (LIPITOR) 10 MG tablet Take 10 mg by mouth daily   Yes Historical Provider, MD   docusate sodium (COLACE) 100 MG capsule Take 100 mg by mouth 2 times daily   Yes Historical Provider, MD   clotrimazole-betamethasone (LOTRISONE) 1-0.05 % cream Apply topically 2 times daily Apply topically 2 times daily. Yes Historical Provider, MD   nystatin (MYCOSTATIN) 169109 UNIT/GM powder Apply topically 4 times daily Apply topically 4 times daily. Yes Historical Provider, MD   omeprazole (PRILOSEC) 20 MG delayed release capsule Take 20 mg by mouth daily   Yes Historical Provider, MD   tiotropium (SPIRIVA HANDIHALER) 18 MCG inhalation capsule Inhale 18 mcg into the lungs daily   Yes Historical Provider, MD   topiramate (TOPAMAX) 200 MG tablet Take 200 mg by mouth 2 times daily   Yes Historical Provider, MD   triamterene-hydroCHLOROthiazide (MAXZIDE-25) 37.5-25 MG per tablet Take 1 tablet by mouth daily   Yes Historical Provider, MD   PARoxetine (PAXIL) 20 MG tablet Take 20 mg by mouth every morning   Yes Historical Provider, MD   amitriptyline (ELAVIL) 25 MG tablet Take 75 mg by mouth nightly    Yes Historical Provider, MD   metoprolol succinate (TOPROL XL) 25 MG extended release tablet Take 25 mg by mouth daily   Yes Historical Provider, MD   linaclotide (LINZESS) 145 MCG capsule Take 145 mcg by mouth every morning (before breakfast)   Yes Historical Provider, MD   oxybutynin (DITROPAN) 5 MG tablet Take 5 mg by mouth 3 times daily.    Yes Historical Provider, MD   acetaminophen (TYLENOL) 325 MG tablet Take 650 mg by mouth every 6 hours as needed for Pain    Historical Provider, MD   VITAMIN D PO Take 1 capsule by mouth every 7 days    Historical Provider, MD   pregabalin (LYRICA) 75 MG capsule Take 1 capsule by mouth every evening for 30 days. Patient taking differently: Take 25 mg by mouth 3 times daily. 6/21/21 11/11/21  Christopher Drake DO   ALBUTEROL SULFATE IN Take 1 ampule by nebulization every 6 hours as needed for Wheezing    Historical Provider, MD   aspirin 81 MG chewable tablet Take 81 mg by mouth 4 times daily     Historical Provider, MD   promethazine (PHENERGAN) 25 MG tablet Take 25 mg by mouth every 6 hours as needed for Nausea    Historical Provider, MD   diphenoxylate-atropine (LOMOTIL) 2.5-0.025 MG per tablet Take 1 tablet by mouth 4 times daily as needed for Diarrhea. Historical Provider, MD       Current medications:    No current facility-administered medications for this encounter. Allergies: Allergies   Allergen Reactions    Fluticasone Itching and Shortness Of Breath     Other reaction(s): Swelling - Throat    Lac Bovis Shortness Of Breath and Swelling     Other reaction(s): Swelling - Throat    Metoclopramide Shortness Of Breath     Other reaction(s): Hives, Swelling - Throat  & throat swelling      Senna Itching and Rash     Other reaction(s): Difficulty breathing    Baclofen      Other reaction(s): Anaphylaxis    Dye [Iodides]     Folic Acid-Vitamin C Hives    Milk-Related Compounds Angioedema     Milk, cottage cheese, sour cream, cream cheese    Nicotine      Allergic to the patch    Trazodone And Nefazodone Hives    Folic Acid Hives       Problem List:  There is no problem list on file for this patient.       Past Medical History:        Diagnosis Date    [facility]) [facility]     Chronic back pain     [facility]     Epilepsia [facility]     GERD (gastroesophageal reflux disease)     Headache     Herniated disc     Hypoglycemia     Osteoarthritis     PONV (postoperative nausea and vomiting)     Prolonged emergence from general anesthesia     Spinal stenosis     Stroke ([address]     Unspecified sleep apnea     Urinary incontinence        Past Surgical History:        Procedure Laterality Date    BACK SURGERY      CHOLECYSTECTOMY  8/16/2011    [facility]  2014    [facility]  [facility]    [facility]       Social History:    Social History     Tobacco Use    Smoking status: Current Every Day Smoker     Packs/day: 0.25     Years: 39.00     Pack years: 9.75     Types: Cigarettes     Start date: 5/23/1978    Smokeless tobacco: Never Used    Tobacco comment: 2 cigarrettes per day   Substance Use Topics    Alcohol use: Yes     Comment: occasionally                                Ready to quit: Not Answered  Counseling given: Not Answered  Comment: 2 cigarrettes per day      Vital Signs (Current):   Vitals:    11/30/21 0914   BP: 131/61   Pulse: 98   Resp: 20   Temp: 97.6 °F (36.4 °C)   TempSrc: Skin   SpO2: 100%   Weight: 248 lb 12.8 oz (112.9 kg)   Height: 5' 5\" (1.651 m)                                              BP Readings from Last 3 Encounters:   11/30/21 131/61   11/11/21 (!) 146/78   06/21/21 116/62       NPO Status: Time of last liquid consumption: 1800                        Time of last solid consumption: 1800                        Date of last liquid consumption: 11/29/21                        Date of last solid food consumption: 11/29/21    BMI:   Wt Readings from Last 3 Encounters:   11/30/21 248 lb 12.8 oz (112.9 kg)   11/11/21 252 lb (114.3 kg)   06/21/21 252 lb (114.3 kg)     Body mass index is 41.4 kg/m².     CBC: No results found for: WBC, RBC, HGB, HCT, MCV, RDW, PLT    CMP: No results found for: NA, K, CL, CO2, BUN, CREATININE, GFRAA, AGRATIO, LABGLOM, GLUCOSE, PROT, CALCIUM, BILITOT, ALKPHOS, AST, ALT    POC Tests: No results for input(s): POCGLU, POCNA, POCK, POCCL, POCBUN, POCHEMO, POCHCT in the last 72 hours. Coags: No results found for: PROTIME, INR, APTT    HCG (If Applicable): No results found for: PREGTESTUR, PREGSERUM, HCG, HCGQUANT     ABGs: No results found for: PHART, PO2ART, MWE0QPT, FHR7QCL, BEART, T4DCPCPT     Type & Screen (If Applicable):  No results found for: LABABO, LABRH    Drug/Infectious Status (If Applicable):  No results found for: HIV, HEPCAB    COVID-19 Screening (If Applicable): No results found for: COVID19        Anesthesia Evaluation  Patient summary reviewed and Nursing notes reviewed   history of anesthetic complications: PONV. Airway: Mallampati: III  TM distance: >3 FB   Neck ROM: full  Mouth opening: > = 3 FB Dental:    (+) edentulous      Pulmonary:   (+) COPD:  sleep apnea:  decreased breath sounds,                             Cardiovascular:  Exercise tolerance: poor (<4 METS),   (+) CHF:,                   Neuro/Psych:   (+) CVA:, headaches:,             GI/Hepatic/Renal:   (+) GERD:, morbid obesity          Endo/Other: Negative Endo/Other ROS             Pt had no PAT visit       Abdominal:   (+) obese,     Abdomen: soft. Vascular: negative vascular ROS. Other Findings:             Anesthesia Plan      MAC     ASA 3       Induction: intravenous. Anesthetic plan and risks discussed with patient. Plan discussed with CRNA.                   [facility] Son[facility], DO   11/30/2021

## 2021-11-30 NOTE — PROCEDURES
Pre-operative Diagnosis: Bilateral lumbar facet pain     Post-operative Diagnosis: Bilateral lumbar facet pain     Procedure: Bilateral lumbar facet steroid injections at L4/L5 and L5/S1    Procedure Description:  After having obtained a signed informed consent, the patient was taken to the fluoroscopy suite where he was placed in the prone position. The patient's back was prepped with chloraprep, and was draped in a sterile fashion. A total of 2 cc of  1 % lidocaine was used to anesthetize the skin and underlying tissues at the desired levels. Under fluoroscopic guidance in an oblique view, two 22-gauge 5 inch spinal needles were advanced under fluoroscopic guidance in a coaxial view to lie within the facet joint at the L4/L5 and L5/S1 levels on the RIGHT. There were no paresthesias, heme, or CSF aspiration. Needle position was confirmed using AP and lateral views. 2.5 mg of Depo-Medrol plus 0.75 cc of 0.5 % bupivacaine were injected. The needles were removed without any complication. The same procedure was repeated on the contralateral side. The patient tolerated the procedure well and was transported to the recovery room and observed for 15 minutes prior to being discharged in ambulatory fashion.     Procedural Complications: None  Estimated Blood Loss: 0 mL       Christopher Drake DO  Interventional Pain Management/PM&R   [facility] and [address] [place] [address]

## 2022-01-31 ENCOUNTER — TELEPHONE (OUTPATIENT)
Dept: PHYSICAL MEDICINE AND REHAB | Age: 62
End: 2022-01-31

## 2022-01-31 NOTE — TELEPHONE ENCOUNTER
I left a voice message on patients phone to RS appt. Dr. Karla Prather will not be in the office 2/3/22.

## 2022-02-18 ENCOUNTER — OFFICE VISIT (OUTPATIENT)
Dept: PHYSICAL MEDICINE AND REHAB | Age: 62
End: 2022-02-18
Payer: MEDICARE

## 2022-02-18 VITALS
DIASTOLIC BLOOD PRESSURE: 74 MMHG | WEIGHT: 248 LBS | SYSTOLIC BLOOD PRESSURE: 122 MMHG | HEIGHT: 65 IN | BODY MASS INDEX: 41.32 KG/M2

## 2022-02-18 DIAGNOSIS — M54.59 LUMBAR FACET JOINT PAIN: ICD-10-CM

## 2022-02-18 DIAGNOSIS — M54.10 RADICULAR LEG PAIN: ICD-10-CM

## 2022-02-18 DIAGNOSIS — G89.29 OTHER CHRONIC PAIN: ICD-10-CM

## 2022-02-18 DIAGNOSIS — M47.816 SPONDYLOSIS WITHOUT MYELOPATHY OR RADICULOPATHY, LUMBAR REGION: Primary | ICD-10-CM

## 2022-02-18 DIAGNOSIS — M79.2 NEUROPATHIC PAIN: ICD-10-CM

## 2022-02-18 DIAGNOSIS — I69.354 HEMIPARESIS AFFECTING LEFT SIDE AS LATE EFFECT OF STROKE (HCC): ICD-10-CM

## 2022-02-18 PROCEDURE — 99214 OFFICE O/P EST MOD 30 MIN: CPT | Performed by: NURSE PRACTITIONER

## 2022-02-18 RX ORDER — PREGABALIN 75 MG/1
75 CAPSULE ORAL NIGHTLY
Qty: 30 CAPSULE | Refills: 0 | Status: SHIPPED | OUTPATIENT
Start: 2022-02-18 | End: 2022-03-20

## 2022-02-18 NOTE — PROGRESS NOTES
Chronic Pain/PM&R Clinic Note     Encounter Date: 2/18/22    Subjective:   Chief Complaint:   Chief Complaint   Patient presents with    Follow-up     12 wk fu       History of Present Illness:   Jeanette Burr is a 63 y.o. female seen in the clinic initially on 12/21/20 upon request from Nguyen Sanchez MD (PCP) for her history of chronic low back pain. 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 She history of seizures (on Topamax). She has been dealing with a longstanding history of chronic pain ever since her strokes. She describes most of her pain in her low back and upper neck. Her low back pain is primarily axial with no radiation of the legs. She does have the left leg weakness from the stroke but no new focal leg weakness, new paresthesias, saddle anesthesia, bowel/bladder incontinence. She primarily uses a walker and a wheelchair for ambulation. He states that her pain is worse with any upright activity and or moving around. Her pain is improved with rest and medications. She states that she was seeing Dr. [place] Leong (pain management) who was previously prescribing her [place] however she states that her risk is about someone reporting that her med box was broken into and she violated her pain contract with the group. She states that she visited with her PCP recently put her on tramadol and she was unable to tolerate this. She states that she had previous epidural injection in the past which did not provide any relief and she has not had any previous RFA. Today, 2/18/2022, patient presents for planned follow-up for chronic low back pain. Patient underwent a lumbar facet steroid injection on 11/30/2022 at L4-5 and L5-S1. Patient reports 100% pain relief x2 days. Patient states she has completed physical therapy at the nursing home. Patient states she is in a wheelchair most of the time but she will get up to walk to the bathroom.   Patient questioning what else she can do for pain throughout the day.     History of Interventions:   Surgery: None  Injections:  Epidural injections in past - no benefit  Lumbar facet steroid injection at L4-5 and L5-S1 (11/30/2022) - 100% relief x 2 days    Current Treatment Medications:   Celexa 40 mg   Elavil 25 mg QHS  Paxil 20 mg  Tylenol PM - for sleep  Lyrica 25mg TID    Historical Treatment Medications:   Flexeril - unable to tolerate  Tramadol - hives  Baclofen - unable to tolerate  Norcostopped by me due to noncompliance with follow-ups    Imaging:  Lumbar XR  (5/7/19)        Past Medical History:   Diagnosis Date    [facility]     Chronic back pain     [facility]     [facility]ps[facility]     GERD (gastroesophageal reflux disease)     Headache     Herniated disc     Hypoglycemia     Osteoarthritis     PONV (postoperative nausea and vomiting)     Prolonged emergence from general anesthesia     Spinal stenosis     Stroke ([address]     Unspecified sleep apnea     Urinary incontinence        Past Surgical History:   Procedure Laterality Date    BACK SURGERY      CHOLECYSTECTOMY  8/16/2011    [facility]    PAIN MANAGEMENT PROCEDURE Bilateral 11/30/2021    lumbar facet steroid injections at bilateral L4/L5 and L5/S1 was chosen performed by Staci White DO at [address]       Family History   Problem Relation Age of Onset    Lung Cancer Mother     Cancer Mother 58        lung,bone and brain    Diabetes Father     Colon Cancer Neg Hx        Social History     Socioeconomic History    Marital status: Single     Spouse name: Not on file    Number of children: Not on file    Years of education: Not on file    Highest education level: Not on file   Occupational History    Not on file   Tobacco Use    Smoking status: Current Every Day Smoker     Packs/day: 0.25     Years: 39.00     Pack years: 9.75     Types: Cigarettes     Start date: 5/23/1978    Smokeless tobacco: Never Used    Tobacco comment: 2 cigarrettes per day   Substance and Sexual Activity    Alcohol use: Yes     Comment: occasionally    Drug use: No    Sexual activity: Not on file   Other Topics Concern    Not on file   Social History Narrative    Not on file     Social Determinants of Health     Financial Resource Strain:     Difficulty of Paying Living Expenses: Not on file   Food Insecurity:     Worried About Running Out of Food in the Last Year: Not on file    [place] of Food in the Last Year: Not on file   Transportation Needs:     Lack of Transportation (Medical): Not on file    Lack of Transportation (Non-Medical):  Not on file   Physical Activity:     Days of Exercise per Week: Not on file    Minutes of Exercise per Session: Not on file   Stress:     Feeling of Stress : Not on file   Social Connections:     Frequency of Communication with Friends and Family: Not on file    Frequency of Social Gatherings with Friends and Family: Not on file    Attends Catholic Services: Not on file    Active Member of [address] [place] [address] [employer] or Organizations: Not on file    Attends Club or Organization Meetings: Not on file    Marital Status: Not on file   Intimate Partner Violence:     Fear of Current or Ex-Partner: Not on file    Emotionally Abused: Not on file    Physically Abused: Not on file    Sexually Abused: Not on file   Housing Stability:     Unable to Pay for Housing in the Last Year: Not on file    Number of [place] in the Last Year: Not on file    Unstable Housing in the Last Year: Not on file       Medications & Allergies:   Current Outpatient Medications   Medication Instructions    acetaminophen (TYLENOL) 650 mg, Oral, EVERY 6 HOURS PRN    ALBUTEROL SULFATE IN 1 ampule, Nebulization, EVERY 6 HOURS PRN    amitriptyline (ELAVIL) 75 mg, Oral, NIGHTLY    aspirin 81 mg, Oral, 4 TIMES DAILY    atorvastatin (LIPITOR) 10 mg, Oral, DAILY    clotrimazole-betamethasone (LOTRISONE) 1-0.05 % cream Topical, 2 TIMES DAILY, Apply topically 2 times daily.  diphenoxylate-atropine (LOMOTIL) 2.5-0.025 MG per tablet 1 tablet, 4 TIMES DAILY PRN    docusate sodium (COLACE) 100 mg, Oral, 2 TIMES DAILY    linaclotide (LINZESS) 145 mcg, Oral, DAILY BEFORE BREAKFAST    metoprolol succinate (TOPROL XL) 25 mg, Oral, DAILY    nystatin (MYCOSTATIN) 445713 UNIT/GM powder Topical, 4 TIMES DAILY, Apply topically 4 times daily.  omeprazole (PRILOSEC) 20 mg, Oral, DAILY    oxybutynin (DITROPAN) 5 mg, 3 TIMES DAILY    PARoxetine (PAXIL) 20 mg, Oral, EVERY MORNING    pregabalin (LYRICA) 75 mg, Oral, Nightly    promethazine (PHENERGAN) 25 mg, Oral, EVERY 6 HOURS PRN    Spiriva HandiHaler 18 mcg, Inhalation, DAILY    topiramate (TOPAMAX) 200 mg, Oral, 2 TIMES DAILY    triamterene-hydroCHLOROthiazide (MAXZIDE-25) 37.5-25 MG per tablet 1 tablet, Oral, DAILY    VITAMIN D PO 1 capsule, Oral, EVERY 7 DAYS       Allergies   Allergen Reactions    Fluticasone Itching and Shortness Of Breath     Other reaction(s): Swelling - Throat    Lac Bovis Shortness Of Breath and Swelling     Other reaction(s): Swelling - Throat    Metoclopramide Shortness Of Breath     Other reaction(s): Hives, Swelling - Throat  & throat swelling      Senna Itching and Rash     Other reaction(s): Difficulty breathing    Baclofen      Other reaction(s):  Anaphylaxis    Dye [Iodides]     Folic Acid-Vitamin C Hives    Milk-Related Compounds Angioedema     Milk, cottage cheese, sour cream, cream cheese    Nicotine      Allergic to the patch    Trazodone And Nefazodone Hives    Folic Acid Hives       Review of Systems:   Constitutional: negative for weight changes or fevers  Genitourinary: negative for bowel/bladder incontinence   Musculoskeletal: positive for low back pain  Neurological: Positive for left-sided spasticity/weakness  Behavioral/Psych: admits to anxiety/depression   All other systems reviewed and are negative    Objective:     Vitals:    02/18/22 0905   BP: 122/74       Constitutional: Pleasant, no acute distress   Head: Normocephalic, atraumatic   Eyes: Conjunctivae normal   Neck: Supple, symmetrical   Lungs: Normal respiratory effort, non-labored breathing   Cardiovascular: Limbs warm and well perfused   Abdomen: Non-protruded   Musculoskeletal: Decreased muscle bulk on left side of body with left hand contracture due to spasticity/tone  · Lumbar Spine: Increased pain with facet loading bilaterally. Tenderness palpation over bilateral lumbar paraspinal muscles. Decreased ROM in extension. Neurological: Cranial nerves II-XII grossly intact. · Gait - Wheelchair dependent  · Motor: Left sided spastic hemiparesis. Difficult to assess true weakness in left leg due to spasticity/tone. · Sensory: Slight allodynia in the left lower limb  · Reflexes: Unable to assess reflexes in lower limbs due to spasticity/tone  Skin: No rashes or lesions visible in lower limbs  Psychological: Cooperative, no exaggerated pain behaviors     Assessment:    Diagnosis Orders   1. Spondylosis without myelopathy or radiculopathy, lumbar region  CHG FLUOR NEEDLE/CATH SPINE/PARASPINAL DX/THER ADDON    [place] INJ DX/THER AGNT PARAVERT FACET JOINT, LUMBAR/SAC, 1ST LEVEL    [place] INJ DX/THER AGNT PARAVERT FACET JOINT, LUMBAR/SAC, 2ND LEVEL   2. Neuropathic pain  pregabalin (LYRICA) 75 MG capsule   3. Lumbar facet joint pain     4. Other chronic pain     5. Hemiparesis affecting left side as late effect of stroke ([address]     6. Radicular leg pain           Samaria Fulton is a 63 y. o.female presenting to the pain clinic for evaluation of chronic low back pain. She has residual left-sided hemiparesis from a stroke 18 years ago. She does have lumbar facet mediated pain. We had a lengthy discussion about the use of long-term opioids. Patient responded significantly to the lumbar facet steroid injection while it only lasted for 2 days. I set her up for a lumbar medial branch block at L4-5 and L5-S1 using MAC with anticipation of proceeding with a lumbar RFA for more longstanding relief. I started on Lyrica 75 mg nightly and discussed this could potentially be increased in the future. Plan: The following treatment recommendations and plan were discussed in detail with Jayesh Washington    Imaging:   I have reviewed patients imaging of XR L-spine and results were discussed with patient today. We may get a MRI of the lumbar spine in the future. Analgesics:   None; patient is no longer a candidate for opioid therapy due to noncompliance with follow-up appointments. Patient is taking Acetaminophen. Patient informed that the maximum amount of acetaminophen taken on a regular basis should only be 4000 mg per day. Adjuvants:  For her neuropathic pain component I have started her on Lyrica 75 mg nightly. This dose may be increased in the future if tolerated. Interventions: In presence of lumbar axial back pain and with physical exam consistent for facetal pain, the option of lumbar facet medial branch block at bilateral L4/L5 and L5/S1 with MAC was chosen. The risks and benefits were discussed in detail with the patient. Patient wants to proceed with the injection. Anticoagulation/NPO Recommendations:   Patient will need to hold aspirin x 6 days prior and any NSAIDS x 2 days prior to the procedure. Patient will need to be NPO x 8 hours prior to the procedure. We will use MAC    Multidisciplinary Pain Management:   In the presence of complex, chronic, and multi-factorial pain, the importance of a multidisciplinary approach to pain management in the patients management regimen was emphasized and discussed in great detail.      Referrals:  None    Prescriptions Written This Visit:   Lyrica 75mg    Follow-up: Bilateral lumbar MBB at L4-5 and L5-S1 with MAC, follow up 1 week      Verl Sin, APRN - CNP

## 2022-02-21 ENCOUNTER — TELEPHONE (OUTPATIENT)
Dept: PHYSICAL MEDICINE AND REHAB | Age: 62
End: 2022-02-21

## (undated) DEVICE — SYRINGE MED 10ML LUERLOCK TIP W/O SFTY DISP

## (undated) DEVICE — MARKER,SKIN,WI/RULER AND LABELS: Brand: MEDLINE

## (undated) DEVICE — GAUZE SPONGES,USP TYPE VII GAUZE, 12 PLY: Brand: CURITY

## (undated) DEVICE — HYPODERMIC SAFETY NEEDLE: Brand: MAGELLAN

## (undated) DEVICE — APPLICATOR MEDICATED 3 CC SOLUTION CLR STRL CHLORAPREP

## (undated) DEVICE — TOWEL,OR,DSP,ST,BLUE,STD,4/PK,20PK/CS: Brand: MEDLINE

## (undated) DEVICE — 3 ML SYRINGE LUER-LOCK TIP: Brand: MONOJECT

## (undated) DEVICE — 1840 FOAM BLOCK NEEDLE COUNTER: Brand: DEVON

## (undated) DEVICE — GLOVE BIOGEL POWDER FREE SZ 8

## (undated) DEVICE — NEEDLE HYPO 18GA L1.5IN THN WALL PIVOTING SHLD BVL ORIENTED

## (undated) DEVICE — NEEDLE SPNL 22GA L3.5IN BLK HUB S STL REG WALL FIT STYL W/